# Patient Record
Sex: FEMALE | Race: WHITE | NOT HISPANIC OR LATINO
[De-identification: names, ages, dates, MRNs, and addresses within clinical notes are randomized per-mention and may not be internally consistent; named-entity substitution may affect disease eponyms.]

---

## 2017-01-12 ENCOUNTER — APPOINTMENT (OUTPATIENT)
Dept: INFUSION THERAPY | Facility: CLINIC | Age: 42
End: 2017-01-12

## 2017-01-13 ENCOUNTER — APPOINTMENT (OUTPATIENT)
Dept: INFUSION THERAPY | Facility: CLINIC | Age: 42
End: 2017-01-13

## 2017-01-16 ENCOUNTER — APPOINTMENT (OUTPATIENT)
Dept: HEMATOLOGY ONCOLOGY | Facility: CLINIC | Age: 42
End: 2017-01-16

## 2017-01-23 ENCOUNTER — APPOINTMENT (OUTPATIENT)
Dept: HEMATOLOGY ONCOLOGY | Facility: CLINIC | Age: 42
End: 2017-01-23

## 2017-01-23 ENCOUNTER — APPOINTMENT (OUTPATIENT)
Dept: INFUSION THERAPY | Facility: CLINIC | Age: 42
End: 2017-01-23

## 2017-01-23 VITALS
SYSTOLIC BLOOD PRESSURE: 151 MMHG | BODY MASS INDEX: 31.76 KG/M2 | RESPIRATION RATE: 14 BRPM | HEIGHT: 64 IN | HEART RATE: 86 BPM | TEMPERATURE: 97.1 F | DIASTOLIC BLOOD PRESSURE: 82 MMHG | WEIGHT: 186 LBS

## 2017-01-24 ENCOUNTER — APPOINTMENT (OUTPATIENT)
Dept: INFUSION THERAPY | Facility: CLINIC | Age: 42
End: 2017-01-24

## 2017-01-24 LAB
ALBUMIN SERPL-MCNC: 3.9 G/DL
ALBUMIN/GLOB SERPL: 1.15
ALP SERPL-CCNC: 92 IU/L
ALT SERPL-CCNC: 46 IU/L
ANION GAP SERPL CALC-SCNC: 9 MEQ/L
AST SERPL-CCNC: 27 IU/L
BASOPHILS # BLD: 0.03 TH/MM3
BASOPHILS # BLD: 0.04 TH/MM3
BASOPHILS NFR BLD: 0.4 %
BASOPHILS NFR BLD: 0.7 %
BILIRUB SERPL-MCNC: 0.3 MG/DL
BUN SERPL-MCNC: 10 MG/DL
BUN/CREAT SERPL: 9 %
CALCIUM SERPL-MCNC: 9.6 MG/DL
CHLORIDE SERPL-SCNC: 104 MEQ/L
CO2 SERPL-SCNC: 25 MEQ/L
CREAT SERPL-MCNC: 1.11 MG/DL
EOSINOPHIL # BLD: 0.31 TH/MM3
EOSINOPHIL # BLD: 0.53 TH/MM3
EOSINOPHIL NFR BLD: 5.3 %
EOSINOPHIL NFR BLD: 7.8 %
ERYTHROCYTE [DISTWIDTH] IN BLOOD BY AUTOMATED COUNT: 12.4 %
ERYTHROCYTE [DISTWIDTH] IN BLOOD BY AUTOMATED COUNT: 12.5 %
GFR SERPL CREATININE-BSD FRML MDRD: 54
GLUCOSE SERPL-MCNC: 74 MG/DL
GRANULOCYTES # BLD: 2.64 TH/MM3
GRANULOCYTES # BLD: 3.5 TH/MM3
GRANULOCYTES NFR BLD: 45.3 %
GRANULOCYTES NFR BLD: 51.2 %
HCT VFR BLD AUTO: 41.5 %
HCT VFR BLD AUTO: 41.8 %
HGB BLD-MCNC: 14 G/DL
HGB BLD-MCNC: 14.1 G/DL
IMM GRANULOCYTES # BLD: 0.01 TH/MM3
IMM GRANULOCYTES # BLD: 0.07 TH/MM3
IMM GRANULOCYTES NFR BLD: 0.2 %
IMM GRANULOCYTES NFR BLD: 1 %
LYMPHOCYTES # BLD: 2.08 TH/MM3
LYMPHOCYTES # BLD: 2.2 TH/MM3
LYMPHOCYTES NFR BLD: 30.5 %
LYMPHOCYTES NFR BLD: 37.8 %
MCH RBC QN AUTO: 30.1 PG
MCH RBC QN AUTO: 30.4 PG
MCHC RBC AUTO-ENTMCNC: 33.5 G/DL
MCHC RBC AUTO-ENTMCNC: 34 G/DL
MCV RBC AUTO: 89.4 FL
MCV RBC AUTO: 89.9 FL
MONOCYTES # BLD: 0.62 TH/MM3
MONOCYTES # BLD: 0.62 TH/MM3
MONOCYTES NFR BLD: 10.7 %
MONOCYTES NFR BLD: 9.1 %
PLATELET # BLD: 281 TH/MM3
PLATELET # BLD: 347 TH/MM3
PMV BLD AUTO: 9.3 FL
PMV BLD AUTO: 9.5 FL
POTASSIUM SERPL-SCNC: 4.3 MMOL/L
PROT SERPL-MCNC: 7.3 G/DL
RBC # BLD AUTO: 4.64 MIL/MM3
RBC # BLD AUTO: 4.65 MIL/MM3
SODIUM SERPL-SCNC: 138 MEQ/L
WBC # BLD: 5.82 TH/MM3
WBC # BLD: 6.83 TH/MM3

## 2017-01-26 ENCOUNTER — OTHER (OUTPATIENT)
Age: 42
End: 2017-01-26

## 2017-01-30 ENCOUNTER — APPOINTMENT (OUTPATIENT)
Dept: HEMATOLOGY ONCOLOGY | Facility: CLINIC | Age: 42
End: 2017-01-30

## 2017-01-30 ENCOUNTER — RESULT REVIEW (OUTPATIENT)
Age: 42
End: 2017-01-30

## 2017-01-31 LAB
APPEARANCE UR: CLEAR
BILIRUB UR QL STRIP: ABNORMAL
COLOR UR: NORMAL
GLUCOSE UR STRIP-MCNC: NEGATIVE MG/DL
HGB UR QL STRIP: NEGATIVE
KETONES UR STRIP-MCNC: ABNORMAL MG/DL
NITRITE UR QL STRIP: NEGATIVE
PH UR STRIP: 6
PROT UR STRIP-MCNC: 30 MG/DL
SP GR UR STRIP: >= 1.03
URINE COMP/EPITH (NORTH): ABNORMAL
UROBILINOGEN UR STRIP-MCNC: 0.2 MG/DL
WBC URNS QL MICRO: ABNORMAL
WBC URNS QL MICRO: ABNORMAL P/HPF

## 2017-02-06 ENCOUNTER — APPOINTMENT (OUTPATIENT)
Dept: HEMATOLOGY ONCOLOGY | Facility: CLINIC | Age: 42
End: 2017-02-06

## 2017-02-06 ENCOUNTER — APPOINTMENT (OUTPATIENT)
Dept: INFUSION THERAPY | Facility: CLINIC | Age: 42
End: 2017-02-06

## 2017-02-06 VITALS
BODY MASS INDEX: 32.44 KG/M2 | RESPIRATION RATE: 16 BRPM | TEMPERATURE: 97.3 F | HEIGHT: 64 IN | SYSTOLIC BLOOD PRESSURE: 109 MMHG | HEART RATE: 69 BPM | WEIGHT: 190 LBS | DIASTOLIC BLOOD PRESSURE: 61 MMHG

## 2017-02-07 ENCOUNTER — APPOINTMENT (OUTPATIENT)
Dept: INFUSION THERAPY | Facility: CLINIC | Age: 42
End: 2017-02-07

## 2017-02-17 ENCOUNTER — APPOINTMENT (OUTPATIENT)
Dept: HEMATOLOGY ONCOLOGY | Facility: CLINIC | Age: 42
End: 2017-02-17

## 2017-02-17 VITALS
BODY MASS INDEX: 30.73 KG/M2 | RESPIRATION RATE: 14 BRPM | TEMPERATURE: 98.7 F | HEART RATE: 84 BPM | SYSTOLIC BLOOD PRESSURE: 119 MMHG | HEIGHT: 64 IN | DIASTOLIC BLOOD PRESSURE: 67 MMHG | WEIGHT: 180 LBS

## 2017-02-22 ENCOUNTER — APPOINTMENT (OUTPATIENT)
Dept: INFUSION THERAPY | Facility: CLINIC | Age: 42
End: 2017-02-22

## 2017-02-28 ENCOUNTER — APPOINTMENT (OUTPATIENT)
Dept: INFUSION THERAPY | Facility: CLINIC | Age: 42
End: 2017-02-28

## 2017-03-06 ENCOUNTER — APPOINTMENT (OUTPATIENT)
Dept: INFUSION THERAPY | Facility: CLINIC | Age: 42
End: 2017-03-06

## 2017-03-06 ENCOUNTER — APPOINTMENT (OUTPATIENT)
Dept: HEMATOLOGY ONCOLOGY | Facility: CLINIC | Age: 42
End: 2017-03-06

## 2017-03-08 ENCOUNTER — APPOINTMENT (OUTPATIENT)
Dept: BREAST CENTER | Facility: CLINIC | Age: 42
End: 2017-03-08

## 2017-03-13 ENCOUNTER — APPOINTMENT (OUTPATIENT)
Dept: BREAST CENTER | Facility: CLINIC | Age: 42
End: 2017-03-13

## 2017-03-13 LAB
ALBUMIN SERPL-MCNC: 3.4 G/DL
ALP SERPL-CCNC: 98 IU/L
ALT SERPL-CCNC: 23 IU/L
AST SERPL-CCNC: 19 IU/L
BACTERIA UR CULT: NORMAL
BASOPHILS # BLD: 0.04 TH/MM3
BASOPHILS # BLD: 0.12 TH/MM3
BASOPHILS # BLD: 0.13 TH/MM3
BASOPHILS NFR BLD: 0.6 %
BASOPHILS NFR BLD: 1.1 %
BASOPHILS NFR BLD: 3.1 %
BILIRUB DIRECT SERPL-MCNC: < 0.1 MG/DL
BILIRUB SERPL-MCNC: 0.3 MG/DL
EOSINOPHIL # BLD: 0.01 TH/MM3
EOSINOPHIL # BLD: 0.05 TH/MM3
EOSINOPHIL # BLD: 0.11 TH/MM3
EOSINOPHIL NFR BLD: 0.2 %
EOSINOPHIL NFR BLD: 0.8 %
EOSINOPHIL NFR BLD: 1 %
ERYTHROCYTE [DISTWIDTH] IN BLOOD BY AUTOMATED COUNT: 12.2 %
ERYTHROCYTE [DISTWIDTH] IN BLOOD BY AUTOMATED COUNT: 12.5 %
ERYTHROCYTE [DISTWIDTH] IN BLOOD BY AUTOMATED COUNT: 14 %
GRANULOCYTES # BLD: 1.79 TH/MM3
GRANULOCYTES # BLD: 4.45 TH/MM3
GRANULOCYTES # BLD: 6.88 TH/MM3
GRANULOCYTES NFR BLD: 42 %
GRANULOCYTES NFR BLD: 61.5 %
GRANULOCYTES NFR BLD: 70.2 %
HCT VFR BLD AUTO: 31.4 %
HCT VFR BLD AUTO: 34.7 %
HCT VFR BLD AUTO: 35.8 %
HGB BLD-MCNC: 11 G/DL
HGB BLD-MCNC: 11.7 G/DL
HGB BLD-MCNC: 12 G/DL
IMM GRANULOCYTES # BLD: 0.05 TH/MM3
IMM GRANULOCYTES # BLD: 0.24 TH/MM3
IMM GRANULOCYTES # BLD: 1.18 TH/MM3
IMM GRANULOCYTES NFR BLD: 0.8 %
IMM GRANULOCYTES NFR BLD: 10.6 %
IMM GRANULOCYTES NFR BLD: 5.6 %
LYMPHOCYTES # BLD: 1.04 TH/MM3
LYMPHOCYTES # BLD: 1.11 TH/MM3
LYMPHOCYTES # BLD: 1.67 TH/MM3
LYMPHOCYTES NFR BLD: 15 %
LYMPHOCYTES NFR BLD: 16.4 %
LYMPHOCYTES NFR BLD: 26.1 %
MCH RBC QN AUTO: 30.5 PG
MCH RBC QN AUTO: 31.1 PG
MCH RBC QN AUTO: 31.2 PG
MCHC RBC AUTO-ENTMCNC: 33.5 G/DL
MCHC RBC AUTO-ENTMCNC: 33.7 G/DL
MCHC RBC AUTO-ENTMCNC: 35 G/DL
MCV RBC AUTO: 89 FL
MCV RBC AUTO: 90.4 FL
MCV RBC AUTO: 92.7 FL
MONOCYTES # BLD: 0.71 TH/MM3
MONOCYTES # BLD: 0.98 TH/MM3
MONOCYTES # BLD: 1.21 TH/MM3
MONOCYTES NFR BLD: 10.8 %
MONOCYTES NFR BLD: 11.2 %
MONOCYTES NFR BLD: 23 %
PLATELET # BLD: 202 TH/MM3
PLATELET # BLD: 214 TH/MM3
PLATELET # BLD: 330 TH/MM3
PMV BLD AUTO: 9.5 FL
PMV BLD AUTO: 9.6 FL
PMV BLD AUTO: 9.9 FL
PROT SERPL-MCNC: 5.8 G/DL
RBC # BLD AUTO: 3.53 MIL/MM3
RBC # BLD AUTO: 3.84 MIL/MM3
RBC # BLD AUTO: 3.86 MIL/MM3
WBC # BLD: 11.17 TH/MM3
WBC # BLD: 4.26 TH/MM3
WBC # BLD: 6.34 TH/MM3

## 2017-03-23 ENCOUNTER — APPOINTMENT (OUTPATIENT)
Dept: HEMATOLOGY ONCOLOGY | Facility: CLINIC | Age: 42
End: 2017-03-23

## 2017-03-23 ENCOUNTER — APPOINTMENT (OUTPATIENT)
Dept: INFUSION THERAPY | Facility: CLINIC | Age: 42
End: 2017-03-23

## 2017-03-23 VITALS
WEIGHT: 183 LBS | RESPIRATION RATE: 14 BRPM | BODY MASS INDEX: 31.24 KG/M2 | HEIGHT: 64 IN | SYSTOLIC BLOOD PRESSURE: 130 MMHG | TEMPERATURE: 97.5 F | DIASTOLIC BLOOD PRESSURE: 75 MMHG | HEART RATE: 86 BPM

## 2017-03-24 ENCOUNTER — APPOINTMENT (OUTPATIENT)
Dept: INFUSION THERAPY | Facility: CLINIC | Age: 42
End: 2017-03-24

## 2017-03-24 LAB
ALBUMIN SERPL-MCNC: 3.7 G/DL
ALP SERPL-CCNC: 104 IU/L
ALT SERPL-CCNC: 31 IU/L
ANION GAP SERPL CALC-SCNC: 11 MEQ/L
AST SERPL-CCNC: 22 IU/L
BASOPHILS # BLD: 0.05 TH/MM3
BASOPHILS NFR BLD: 1 %
BILIRUB DIRECT SERPL-MCNC: < 0.1 MG/DL
BILIRUB SERPL-MCNC: 0.4 MG/DL
BUN SERPL-MCNC: 8 MG/DL
BUN/CREAT SERPL: 8.3 %
CALCIUM SERPL-MCNC: 9.3 MG/DL
CHLORIDE SERPL-SCNC: 104 MEQ/L
CO2 SERPL-SCNC: 24 MEQ/L
CREAT SERPL-MCNC: 0.96 MG/DL
EOSINOPHIL # BLD: 0.63 TH/MM3
EOSINOPHIL NFR BLD: 13.1 %
ERYTHROCYTE [DISTWIDTH] IN BLOOD BY AUTOMATED COUNT: 13.9 %
GFR SERPL CREATININE-BSD FRML MDRD: 63
GLUCOSE SERPL-MCNC: 90 MG/DL
GRANULOCYTES # BLD: 2.44 TH/MM3
GRANULOCYTES NFR BLD: 50.8 %
HCT VFR BLD AUTO: 35.8 %
HGB BLD-MCNC: 12.1 G/DL
IMM GRANULOCYTES # BLD: 0.01 TH/MM3
IMM GRANULOCYTES NFR BLD: 0.2 %
LYMPHOCYTES # BLD: 1.29 TH/MM3
LYMPHOCYTES NFR BLD: 26.8 %
MCH RBC QN AUTO: 30.8 PG
MCHC RBC AUTO-ENTMCNC: 33.8 G/DL
MCV RBC AUTO: 91.1 FL
MONOCYTES # BLD: 0.39 TH/MM3
MONOCYTES NFR BLD: 8.1 %
PLATELET # BLD: 263 TH/MM3
PMV BLD AUTO: 9.3 FL
POTASSIUM SERPL-SCNC: 3.9 MMOL/L
PROT SERPL-MCNC: 6.9 G/DL
RBC # BLD AUTO: 3.93 MIL/MM3
SODIUM SERPL-SCNC: 139 MEQ/L
WBC # BLD: 4.81 TH/MM3

## 2017-03-28 ENCOUNTER — APPOINTMENT (OUTPATIENT)
Dept: INFUSION THERAPY | Facility: CLINIC | Age: 42
End: 2017-03-28

## 2017-03-28 ENCOUNTER — OUTPATIENT (OUTPATIENT)
Dept: OUTPATIENT SERVICES | Facility: HOSPITAL | Age: 42
LOS: 1 days | Discharge: HOME | End: 2017-03-28

## 2017-04-12 ENCOUNTER — APPOINTMENT (OUTPATIENT)
Dept: BREAST CENTER | Facility: CLINIC | Age: 42
End: 2017-04-12

## 2017-04-13 ENCOUNTER — APPOINTMENT (OUTPATIENT)
Dept: HEMATOLOGY ONCOLOGY | Facility: CLINIC | Age: 42
End: 2017-04-13

## 2017-04-18 ENCOUNTER — APPOINTMENT (OUTPATIENT)
Dept: INFUSION THERAPY | Facility: CLINIC | Age: 42
End: 2017-04-18

## 2017-04-20 ENCOUNTER — APPOINTMENT (OUTPATIENT)
Dept: INFUSION THERAPY | Facility: CLINIC | Age: 42
End: 2017-04-20

## 2017-05-15 ENCOUNTER — RX RENEWAL (OUTPATIENT)
Age: 42
End: 2017-05-15

## 2017-05-15 DIAGNOSIS — K12.30 ORAL MUCOSITIS (ULCERATIVE), UNSPECIFIED: ICD-10-CM

## 2017-05-19 ENCOUNTER — APPOINTMENT (OUTPATIENT)
Dept: HEMATOLOGY ONCOLOGY | Facility: CLINIC | Age: 42
End: 2017-05-19

## 2017-05-19 ENCOUNTER — APPOINTMENT (OUTPATIENT)
Dept: INFUSION THERAPY | Facility: CLINIC | Age: 42
End: 2017-05-19

## 2017-05-19 VITALS
WEIGHT: 181 LBS | DIASTOLIC BLOOD PRESSURE: 78 MMHG | HEART RATE: 88 BPM | RESPIRATION RATE: 14 BRPM | HEIGHT: 64 IN | TEMPERATURE: 99.3 F | SYSTOLIC BLOOD PRESSURE: 122 MMHG | BODY MASS INDEX: 30.9 KG/M2

## 2017-05-24 ENCOUNTER — OUTPATIENT (OUTPATIENT)
Dept: OUTPATIENT SERVICES | Facility: HOSPITAL | Age: 42
LOS: 1 days | Discharge: HOME | End: 2017-05-24

## 2017-05-26 ENCOUNTER — APPOINTMENT (OUTPATIENT)
Dept: INFUSION THERAPY | Facility: CLINIC | Age: 42
End: 2017-05-26

## 2017-05-26 LAB
ALBUMIN SERPL-MCNC: 3.2 G/DL
ALBUMIN SERPL-MCNC: 3.6 G/DL
ALP SERPL-CCNC: 79 IU/L
ALP SERPL-CCNC: 82 IU/L
ALT SERPL-CCNC: 32 IU/L
ALT SERPL-CCNC: 52 IU/L
ANION GAP SERPL CALC-SCNC: 11 MEQ/L
ANION GAP SERPL CALC-SCNC: 12 MEQ/L
AST SERPL-CCNC: 21 IU/L
AST SERPL-CCNC: 37 IU/L
BASOPHILS # BLD: 0.03 TH/MM3
BASOPHILS # BLD: 0.04 TH/MM3
BASOPHILS NFR BLD: 0.4 %
BASOPHILS NFR BLD: 0.8 %
BILIRUB DIRECT SERPL-MCNC: < 0.1 MG/DL
BILIRUB DIRECT SERPL-MCNC: < 0.1 MG/DL
BILIRUB SERPL-MCNC: 0.4 MG/DL
BILIRUB SERPL-MCNC: 0.5 MG/DL
BUN SERPL-MCNC: 10 MG/DL
BUN SERPL-MCNC: 5 MG/DL
BUN/CREAT SERPL: 12.7 %
BUN/CREAT SERPL: 6.2 %
CALCIUM SERPL-MCNC: 9.3 MG/DL
CALCIUM SERPL-MCNC: 9.4 MG/DL
CHLORIDE SERPL-SCNC: 102 MEQ/L
CHLORIDE SERPL-SCNC: 102 MEQ/L
CO2 SERPL-SCNC: 23 MEQ/L
CO2 SERPL-SCNC: 24 MEQ/L
CREAT SERPL-MCNC: 0.79 MG/DL
CREAT SERPL-MCNC: 0.81 MG/DL
EOSINOPHIL # BLD: 0.08 TH/MM3
EOSINOPHIL # BLD: 0.28 TH/MM3
EOSINOPHIL NFR BLD: 2.1 %
EOSINOPHIL NFR BLD: 2.8 %
ERYTHROCYTE [DISTWIDTH] IN BLOOD BY AUTOMATED COUNT: 14 %
ERYTHROCYTE [DISTWIDTH] IN BLOOD BY AUTOMATED COUNT: 14.5 %
GFR SERPL CREATININE-BSD FRML MDRD: 77
GFR SERPL CREATININE-BSD FRML MDRD: 79
GLUCOSE SERPL-MCNC: 107 MG/DL
GLUCOSE SERPL-MCNC: 78 MG/DL
GRANULOCYTES # BLD: 1.89 TH/MM3
GRANULOCYTES # BLD: 6.73 TH/MM3
GRANULOCYTES NFR BLD: 49.1 %
GRANULOCYTES NFR BLD: 66.9 %
HCT VFR BLD AUTO: 33.6 %
HCT VFR BLD AUTO: 36.1 %
HGB BLD-MCNC: 10.9 G/DL
HGB BLD-MCNC: 12 G/DL
IMM GRANULOCYTES # BLD: 0.05 TH/MM3
IMM GRANULOCYTES # BLD: 0.13 TH/MM3
IMM GRANULOCYTES NFR BLD: 1.3 %
IMM GRANULOCYTES NFR BLD: 1.3 %
LYMPHOCYTES # BLD: 1.14 TH/MM3
LYMPHOCYTES # BLD: 1.46 TH/MM3
LYMPHOCYTES NFR BLD: 14.5 %
LYMPHOCYTES NFR BLD: 29.6 %
MCH RBC QN AUTO: 30.7 PG
MCH RBC QN AUTO: 30.7 PG
MCHC RBC AUTO-ENTMCNC: 32.4 G/DL
MCHC RBC AUTO-ENTMCNC: 33.2 G/DL
MCV RBC AUTO: 92.3 FL
MCV RBC AUTO: 94.6 FL
MONOCYTES # BLD: 0.66 TH/MM3
MONOCYTES # BLD: 1.42 TH/MM3
MONOCYTES NFR BLD: 14.1 %
MONOCYTES NFR BLD: 17.1 %
PLATELET # BLD: 220 TH/MM3
PLATELET # BLD: 275 TH/MM3
PMV BLD AUTO: 10 FL
PMV BLD AUTO: 10.2 FL
POTASSIUM SERPL-SCNC: 3.8 MMOL/L
POTASSIUM SERPL-SCNC: 4.2 MMOL/L
PROT SERPL-MCNC: 6.2 G/DL
PROT SERPL-MCNC: 6.7 G/DL
RBC # BLD AUTO: 3.55 MIL/MM3
RBC # BLD AUTO: 3.91 MIL/MM3
SODIUM SERPL-SCNC: 137 MEQ/L
SODIUM SERPL-SCNC: 137 MEQ/L
WBC # BLD: 10.06 TH/MM3
WBC # BLD: 3.85 TH/MM3

## 2017-05-31 ENCOUNTER — APPOINTMENT (OUTPATIENT)
Dept: BREAST CENTER | Facility: CLINIC | Age: 42
End: 2017-05-31

## 2017-06-02 ENCOUNTER — APPOINTMENT (OUTPATIENT)
Dept: INFUSION THERAPY | Facility: CLINIC | Age: 42
End: 2017-06-02

## 2017-06-09 ENCOUNTER — APPOINTMENT (OUTPATIENT)
Dept: INFUSION THERAPY | Facility: CLINIC | Age: 42
End: 2017-06-09

## 2017-06-16 ENCOUNTER — APPOINTMENT (OUTPATIENT)
Dept: HEMATOLOGY ONCOLOGY | Facility: CLINIC | Age: 42
End: 2017-06-16

## 2017-06-16 ENCOUNTER — APPOINTMENT (OUTPATIENT)
Dept: INFUSION THERAPY | Facility: CLINIC | Age: 42
End: 2017-06-16

## 2017-06-16 VITALS
TEMPERATURE: 97.2 F | HEIGHT: 64 IN | WEIGHT: 181 LBS | BODY MASS INDEX: 30.9 KG/M2 | SYSTOLIC BLOOD PRESSURE: 111 MMHG | RESPIRATION RATE: 12 BRPM | HEART RATE: 84 BPM | DIASTOLIC BLOOD PRESSURE: 85 MMHG

## 2017-06-16 LAB
BASOPHILS # BLD: 0.03 TH/MM3
BASOPHILS # BLD: 0.04 TH/MM3
BASOPHILS NFR BLD: 0.6 %
BASOPHILS NFR BLD: 0.8 %
EOSINOPHIL # BLD: 0.31 TH/MM3
EOSINOPHIL # BLD: 0.38 TH/MM3
EOSINOPHIL NFR BLD: 6.2 %
EOSINOPHIL NFR BLD: 7.7 %
ERYTHROCYTE [DISTWIDTH] IN BLOOD BY AUTOMATED COUNT: 13.9 %
ERYTHROCYTE [DISTWIDTH] IN BLOOD BY AUTOMATED COUNT: 14.1 %
GRANULOCYTES # BLD: 2.72 TH/MM3
GRANULOCYTES # BLD: 2.75 TH/MM3
GRANULOCYTES NFR BLD: 54.7 %
GRANULOCYTES NFR BLD: 55.5 %
HCT VFR BLD AUTO: 37.4 %
HCT VFR BLD AUTO: 37.6 %
HGB BLD-MCNC: 12.7 G/DL
HGB BLD-MCNC: 12.9 G/DL
IMM GRANULOCYTES # BLD: 0.02 TH/MM3
IMM GRANULOCYTES # BLD: 0.06 TH/MM3
IMM GRANULOCYTES NFR BLD: 0.4 %
IMM GRANULOCYTES NFR BLD: 1.2 %
LYMPHOCYTES # BLD: 1.18 TH/MM3
LYMPHOCYTES # BLD: 1.48 TH/MM3
LYMPHOCYTES NFR BLD: 24 %
LYMPHOCYTES NFR BLD: 29.5 %
MCH RBC QN AUTO: 31.1 PG
MCH RBC QN AUTO: 31.2 PG
MCHC RBC AUTO-ENTMCNC: 33.8 G/DL
MCHC RBC AUTO-ENTMCNC: 34.5 G/DL
MCV RBC AUTO: 90.3 FL
MCV RBC AUTO: 92.2 FL
MONOCYTES # BLD: 0.38 TH/MM3
MONOCYTES # BLD: 0.58 TH/MM3
MONOCYTES NFR BLD: 11.8 %
MONOCYTES NFR BLD: 7.6 %
PLATELET # BLD: 215 TH/MM3
PLATELET # BLD: 267 TH/MM3
PMV BLD AUTO: 9.5 FL
PMV BLD AUTO: 9.5 FL
RBC # BLD AUTO: 4.08 MIL/MM3
RBC # BLD AUTO: 4.14 MIL/MM3
WBC # BLD: 4.91 TH/MM3
WBC # BLD: 5.02 TH/MM3

## 2017-06-19 ENCOUNTER — RX RENEWAL (OUTPATIENT)
Age: 42
End: 2017-06-19

## 2017-06-23 ENCOUNTER — APPOINTMENT (OUTPATIENT)
Dept: INFUSION THERAPY | Facility: CLINIC | Age: 42
End: 2017-06-23

## 2017-06-29 DIAGNOSIS — C50.912 MALIGNANT NEOPLASM OF UNSPECIFIED SITE OF LEFT FEMALE BREAST: ICD-10-CM

## 2017-06-30 ENCOUNTER — APPOINTMENT (OUTPATIENT)
Dept: INFUSION THERAPY | Facility: CLINIC | Age: 42
End: 2017-06-30

## 2017-06-30 VITALS
HEART RATE: 86 BPM | DIASTOLIC BLOOD PRESSURE: 85 MMHG | SYSTOLIC BLOOD PRESSURE: 120 MMHG | TEMPERATURE: 97 F | RESPIRATION RATE: 14 BRPM

## 2017-07-07 ENCOUNTER — APPOINTMENT (OUTPATIENT)
Dept: INFUSION THERAPY | Facility: CLINIC | Age: 42
End: 2017-07-07

## 2017-07-13 ENCOUNTER — RESULT REVIEW (OUTPATIENT)
Age: 42
End: 2017-07-13

## 2017-07-14 ENCOUNTER — APPOINTMENT (OUTPATIENT)
Dept: INFUSION THERAPY | Facility: CLINIC | Age: 42
End: 2017-07-14

## 2017-07-14 ENCOUNTER — APPOINTMENT (OUTPATIENT)
Dept: HEMATOLOGY ONCOLOGY | Facility: CLINIC | Age: 42
End: 2017-07-14

## 2017-07-14 VITALS
HEART RATE: 83 BPM | DIASTOLIC BLOOD PRESSURE: 84 MMHG | HEIGHT: 64 IN | TEMPERATURE: 97.8 F | BODY MASS INDEX: 30.9 KG/M2 | RESPIRATION RATE: 14 BRPM | SYSTOLIC BLOOD PRESSURE: 131 MMHG | WEIGHT: 181 LBS

## 2017-07-21 ENCOUNTER — APPOINTMENT (OUTPATIENT)
Dept: INFUSION THERAPY | Facility: CLINIC | Age: 42
End: 2017-07-21

## 2017-07-21 VITALS
DIASTOLIC BLOOD PRESSURE: 68 MMHG | SYSTOLIC BLOOD PRESSURE: 126 MMHG | HEART RATE: 85 BPM | TEMPERATURE: 97.7 F | RESPIRATION RATE: 18 BRPM

## 2017-07-28 ENCOUNTER — APPOINTMENT (OUTPATIENT)
Dept: INFUSION THERAPY | Facility: CLINIC | Age: 42
End: 2017-07-28

## 2017-08-04 ENCOUNTER — APPOINTMENT (OUTPATIENT)
Dept: INFUSION THERAPY | Facility: CLINIC | Age: 42
End: 2017-08-04

## 2017-08-04 VITALS
HEART RATE: 79 BPM | DIASTOLIC BLOOD PRESSURE: 76 MMHG | SYSTOLIC BLOOD PRESSURE: 131 MMHG | TEMPERATURE: 98.2 F | RESPIRATION RATE: 14 BRPM

## 2017-08-11 ENCOUNTER — APPOINTMENT (OUTPATIENT)
Dept: HEMATOLOGY ONCOLOGY | Facility: CLINIC | Age: 42
End: 2017-08-11

## 2017-08-11 ENCOUNTER — APPOINTMENT (OUTPATIENT)
Dept: INFUSION THERAPY | Facility: CLINIC | Age: 42
End: 2017-08-11

## 2017-08-11 VITALS
RESPIRATION RATE: 14 BRPM | HEART RATE: 83 BPM | BODY MASS INDEX: 31.76 KG/M2 | TEMPERATURE: 98.3 F | SYSTOLIC BLOOD PRESSURE: 134 MMHG | WEIGHT: 186 LBS | HEIGHT: 64 IN | DIASTOLIC BLOOD PRESSURE: 76 MMHG

## 2017-08-17 ENCOUNTER — RX RENEWAL (OUTPATIENT)
Age: 42
End: 2017-08-17

## 2017-08-18 ENCOUNTER — APPOINTMENT (OUTPATIENT)
Dept: INFUSION THERAPY | Facility: CLINIC | Age: 42
End: 2017-08-18

## 2017-08-18 ENCOUNTER — OUTPATIENT (OUTPATIENT)
Dept: OUTPATIENT SERVICES | Facility: HOSPITAL | Age: 42
LOS: 1 days | Discharge: HOME | End: 2017-08-18

## 2017-08-18 DIAGNOSIS — Z51.11 ENCOUNTER FOR ANTINEOPLASTIC CHEMOTHERAPY: ICD-10-CM

## 2017-08-18 DIAGNOSIS — C50.812 MALIGNANT NEOPLASM OF OVERLAPPING SITES OF LEFT FEMALE BREAST: ICD-10-CM

## 2017-08-27 LAB
ALBUMIN SERPL-MCNC: 3.7 G/DL
ALBUMIN SERPL-MCNC: 3.8 G/DL
ALBUMIN/GLOB SERPL: 1.48
ALP SERPL-CCNC: 71 IU/L
ALP SERPL-CCNC: 77 IU/L
ALT SERPL-CCNC: 24 IU/L
ALT SERPL-CCNC: 39 IU/L
ANION GAP SERPL CALC-SCNC: 7 MEQ/L
ANION GAP SERPL CALC-SCNC: 8 MEQ/L
AST SERPL-CCNC: 19 IU/L
AST SERPL-CCNC: 28 IU/L
BASOPHILS # BLD: 0.02 TH/MM3
BASOPHILS # BLD: 0.03 TH/MM3
BASOPHILS # BLD: 0.07 TH/MM3
BASOPHILS NFR BLD: 0.4 %
BASOPHILS NFR BLD: 0.4 %
BASOPHILS NFR BLD: 0.5 %
BASOPHILS NFR BLD: 0.5 %
BASOPHILS NFR BLD: 0.6 %
BASOPHILS NFR BLD: 1.5 %
BILIRUB DIRECT SERPL-MCNC: < 0.1 MG/DL
BILIRUB SERPL-MCNC: 0.3 MG/DL
BILIRUB SERPL-MCNC: 0.6 MG/DL
BUN SERPL-MCNC: 6 MG/DL
BUN SERPL-MCNC: 9 MG/DL
BUN/CREAT SERPL: 11.8 %
BUN/CREAT SERPL: 8 %
CALCIUM SERPL-MCNC: 9 MG/DL
CALCIUM SERPL-MCNC: 9 MG/DL
CHLORIDE SERPL-SCNC: 107 MEQ/L
CHLORIDE SERPL-SCNC: 107 MEQ/L
CO2 SERPL-SCNC: 24 MEQ/L
CO2 SERPL-SCNC: 25 MEQ/L
CREAT SERPL-MCNC: 0.75 MG/DL
CREAT SERPL-MCNC: 0.76 MG/DL
EOSINOPHIL # BLD: 0.1 TH/MM3
EOSINOPHIL # BLD: 0.12 TH/MM3
EOSINOPHIL # BLD: 0.12 TH/MM3
EOSINOPHIL # BLD: 0.13 TH/MM3
EOSINOPHIL # BLD: 0.14 TH/MM3
EOSINOPHIL # BLD: 0.15 TH/MM3
EOSINOPHIL # BLD: 0.15 TH/MM3
EOSINOPHIL # BLD: 0.17 TH/MM3
EOSINOPHIL NFR BLD: 2.3 %
EOSINOPHIL NFR BLD: 2.4 %
EOSINOPHIL NFR BLD: 2.5 %
EOSINOPHIL NFR BLD: 2.9 %
EOSINOPHIL NFR BLD: 3 %
EOSINOPHIL NFR BLD: 3.2 %
EOSINOPHIL NFR BLD: 3.2 %
EOSINOPHIL NFR BLD: 3.9 %
ERYTHROCYTE [DISTWIDTH] IN BLOOD BY AUTOMATED COUNT: 14.3 %
ERYTHROCYTE [DISTWIDTH] IN BLOOD BY AUTOMATED COUNT: 14.3 %
ERYTHROCYTE [DISTWIDTH] IN BLOOD BY AUTOMATED COUNT: 14.9 %
ERYTHROCYTE [DISTWIDTH] IN BLOOD BY AUTOMATED COUNT: 15.1 %
ERYTHROCYTE [DISTWIDTH] IN BLOOD BY AUTOMATED COUNT: 15.4 %
ERYTHROCYTE [DISTWIDTH] IN BLOOD BY AUTOMATED COUNT: 15.5 %
ERYTHROCYTE [DISTWIDTH] IN BLOOD BY AUTOMATED COUNT: 15.8 %
ERYTHROCYTE [DISTWIDTH] IN BLOOD BY AUTOMATED COUNT: 16 %
ERYTHROCYTE [DISTWIDTH] IN BLOOD BY AUTOMATED COUNT: 16.1 %
GFR SERPL CREATININE-BSD FRML MDRD: 83
GFR SERPL CREATININE-BSD FRML MDRD: 84
GLUCOSE SERPL-MCNC: 75 MG/DL
GLUCOSE SERPL-MCNC: 82 MG/DL
GRANULOCYTES # BLD: 2.3 TH/MM3
GRANULOCYTES # BLD: 2.36 TH/MM3
GRANULOCYTES # BLD: 2.7 TH/MM3
GRANULOCYTES # BLD: 2.82 TH/MM3
GRANULOCYTES # BLD: 2.86 TH/MM3
GRANULOCYTES # BLD: 3.05 TH/MM3
GRANULOCYTES # BLD: 3.1 TH/MM3
GRANULOCYTES # BLD: 3.3 TH/MM3
GRANULOCYTES # BLD: 3.4 TH/MM3
GRANULOCYTES NFR BLD: 56.6 %
GRANULOCYTES NFR BLD: 58 %
GRANULOCYTES NFR BLD: 61 %
GRANULOCYTES NFR BLD: 61.5 %
GRANULOCYTES NFR BLD: 61.8 %
GRANULOCYTES NFR BLD: 62.2 %
GRANULOCYTES NFR BLD: 62.8 %
GRANULOCYTES NFR BLD: 66.1 %
GRANULOCYTES NFR BLD: 67.5 %
HCT VFR BLD AUTO: 33.2 %
HCT VFR BLD AUTO: 34.2 %
HCT VFR BLD AUTO: 34.3 %
HCT VFR BLD AUTO: 34.3 %
HCT VFR BLD AUTO: 34.4 %
HCT VFR BLD AUTO: 34.6 %
HCT VFR BLD AUTO: 34.9 %
HCT VFR BLD AUTO: 34.9 %
HCT VFR BLD AUTO: 35.5 %
HGB BLD-MCNC: 11.3 G/DL
HGB BLD-MCNC: 11.6 G/DL
HGB BLD-MCNC: 11.7 G/DL
HGB BLD-MCNC: 11.7 G/DL
HGB BLD-MCNC: 11.8 G/DL
HGB BLD-MCNC: 11.9 G/DL
HGB BLD-MCNC: 12.2 G/DL
IMM GRANULOCYTES # BLD: 0.03 TH/MM3
IMM GRANULOCYTES # BLD: 0.04 TH/MM3
IMM GRANULOCYTES # BLD: 0.05 TH/MM3
IMM GRANULOCYTES # BLD: 0.06 TH/MM3
IMM GRANULOCYTES # BLD: 0.06 TH/MM3
IMM GRANULOCYTES NFR BLD: 0.8 %
IMM GRANULOCYTES NFR BLD: 1 %
IMM GRANULOCYTES NFR BLD: 1.1 %
IMM GRANULOCYTES NFR BLD: 1.1 %
IMM GRANULOCYTES NFR BLD: 1.2 %
IMM GRANULOCYTES NFR BLD: 1.3 %
LYMPHOCYTES # BLD: 1.02 TH/MM3
LYMPHOCYTES # BLD: 1.1 TH/MM3
LYMPHOCYTES # BLD: 1.14 TH/MM3
LYMPHOCYTES # BLD: 1.19 TH/MM3
LYMPHOCYTES # BLD: 1.19 TH/MM3
LYMPHOCYTES # BLD: 1.24 TH/MM3
LYMPHOCYTES # BLD: 1.38 TH/MM3
LYMPHOCYTES # BLD: 1.41 TH/MM3
LYMPHOCYTES # BLD: 1.44 TH/MM3
LYMPHOCYTES NFR BLD: 23.2 %
LYMPHOCYTES NFR BLD: 24.5 %
LYMPHOCYTES NFR BLD: 25.2 %
LYMPHOCYTES NFR BLD: 25.6 %
LYMPHOCYTES NFR BLD: 26.7 %
LYMPHOCYTES NFR BLD: 27.4 %
LYMPHOCYTES NFR BLD: 28.2 %
LYMPHOCYTES NFR BLD: 29 %
LYMPHOCYTES NFR BLD: 31.2 %
MAGNESIUM SERPL-MCNC: 2 MG/DL
MCH RBC QN AUTO: 30.5 PG
MCH RBC QN AUTO: 30.9 PG
MCH RBC QN AUTO: 31.5 PG
MCH RBC QN AUTO: 31.6 PG
MCH RBC QN AUTO: 32.1 PG
MCH RBC QN AUTO: 32.3 PG
MCH RBC QN AUTO: 32.3 PG
MCHC RBC AUTO-ENTMCNC: 33.5 G/DL
MCHC RBC AUTO-ENTMCNC: 33.9 G/DL
MCHC RBC AUTO-ENTMCNC: 34 G/DL
MCHC RBC AUTO-ENTMCNC: 34.1 G/DL
MCHC RBC AUTO-ENTMCNC: 34.3 G/DL
MCHC RBC AUTO-ENTMCNC: 34.4 G/DL
MCHC RBC AUTO-ENTMCNC: 34.4 G/DL
MCV RBC AUTO: 90.6 FL
MCV RBC AUTO: 91.1 FL
MCV RBC AUTO: 92.2 FL
MCV RBC AUTO: 92.7 FL
MCV RBC AUTO: 93.2 FL
MCV RBC AUTO: 93.9 FL
MCV RBC AUTO: 94.1 FL
MCV RBC AUTO: 94.2 FL
MCV RBC AUTO: 94.7 FL
MONOCYTES # BLD: 0.24 TH/MM3
MONOCYTES # BLD: 0.26 TH/MM3
MONOCYTES # BLD: 0.27 TH/MM3
MONOCYTES # BLD: 0.34 TH/MM3
MONOCYTES # BLD: 0.35 TH/MM3
MONOCYTES # BLD: 0.37 TH/MM3
MONOCYTES # BLD: 0.38 TH/MM3
MONOCYTES # BLD: 0.4 TH/MM3
MONOCYTES # BLD: 0.4 TH/MM3
MONOCYTES NFR BLD: 5 %
MONOCYTES NFR BLD: 6.3 %
MONOCYTES NFR BLD: 6.8 %
MONOCYTES NFR BLD: 8 %
MONOCYTES NFR BLD: 8.2 %
MONOCYTES NFR BLD: 8.2 %
MONOCYTES NFR BLD: 8.4 %
PLATELET # BLD: 230 TH/MM3
PLATELET # BLD: 242 TH/MM3
PLATELET # BLD: 271 TH/MM3
PLATELET # BLD: 281 TH/MM3
PLATELET # BLD: 285 TH/MM3
PLATELET # BLD: 291 TH/MM3
PLATELET # BLD: 295 TH/MM3
PLATELET # BLD: 302 TH/MM3
PLATELET # BLD: 310 TH/MM3
PMV BLD AUTO: 10 FL
PMV BLD AUTO: 10.1 FL
PMV BLD AUTO: 9.1 FL
PMV BLD AUTO: 9.4 FL
PMV BLD AUTO: 9.4 FL
PMV BLD AUTO: 9.5 FL
PMV BLD AUTO: 9.6 FL
PMV BLD AUTO: 9.6 FL
PMV BLD AUTO: 9.7 FL
POTASSIUM SERPL-SCNC: 3.7 MMOL/L
POTASSIUM SERPL-SCNC: 3.9 MMOL/L
PROT SERPL-MCNC: 6.2 G/DL
PROT SERPL-MCNC: 6.2 G/DL
RBC # BLD AUTO: 3.58 MIL/MM3
RBC # BLD AUTO: 3.61 MIL/MM3
RBC # BLD AUTO: 3.65 MIL/MM3
RBC # BLD AUTO: 3.68 MIL/MM3
RBC # BLD AUTO: 3.71 MIL/MM3
RBC # BLD AUTO: 3.72 MIL/MM3
RBC # BLD AUTO: 3.78 MIL/MM3
RBC # BLD AUTO: 3.82 MIL/MM3
RBC # BLD AUTO: 3.83 MIL/MM3
SODIUM SERPL-SCNC: 138 MEQ/L
SODIUM SERPL-SCNC: 140 MEQ/L
WBC # BLD: 3.82 TH/MM3
WBC # BLD: 3.97 TH/MM3
WBC # BLD: 4.53 TH/MM3
WBC # BLD: 4.6 TH/MM3
WBC # BLD: 4.65 TH/MM3
WBC # BLD: 4.76 TH/MM3
WBC # BLD: 5 TH/MM3
WBC # BLD: 5.14 TH/MM3
WBC # BLD: 5.25 TH/MM3

## 2017-08-30 ENCOUNTER — OUTPATIENT (OUTPATIENT)
Dept: OUTPATIENT SERVICES | Facility: HOSPITAL | Age: 42
LOS: 1 days | Discharge: HOME | End: 2017-08-30

## 2017-08-30 DIAGNOSIS — R92.2 INCONCLUSIVE MAMMOGRAM: ICD-10-CM

## 2017-08-30 DIAGNOSIS — Z85.3 PERSONAL HISTORY OF MALIGNANT NEOPLASM OF BREAST: ICD-10-CM

## 2017-08-30 DIAGNOSIS — Z12.31 ENCOUNTER FOR SCREENING MAMMOGRAM FOR MALIGNANT NEOPLASM OF BREAST: ICD-10-CM

## 2017-08-30 DIAGNOSIS — R92.8 OTHER ABNORMAL AND INCONCLUSIVE FINDINGS ON DIAGNOSTIC IMAGING OF BREAST: ICD-10-CM

## 2017-09-06 ENCOUNTER — APPOINTMENT (OUTPATIENT)
Dept: BREAST CENTER | Facility: CLINIC | Age: 42
End: 2017-09-06
Payer: COMMERCIAL

## 2017-09-06 VITALS
HEIGHT: 64 IN | DIASTOLIC BLOOD PRESSURE: 80 MMHG | WEIGHT: 186 LBS | BODY MASS INDEX: 31.76 KG/M2 | SYSTOLIC BLOOD PRESSURE: 110 MMHG

## 2017-09-06 PROCEDURE — 99213 OFFICE O/P EST LOW 20 MIN: CPT

## 2017-09-07 ENCOUNTER — RESULT REVIEW (OUTPATIENT)
Age: 42
End: 2017-09-07

## 2017-09-07 ENCOUNTER — APPOINTMENT (OUTPATIENT)
Dept: HEMATOLOGY ONCOLOGY | Facility: CLINIC | Age: 42
End: 2017-09-07

## 2017-09-07 VITALS
HEART RATE: 73 BPM | RESPIRATION RATE: 14 BRPM | TEMPERATURE: 97.5 F | HEIGHT: 64 IN | SYSTOLIC BLOOD PRESSURE: 140 MMHG | BODY MASS INDEX: 31.76 KG/M2 | DIASTOLIC BLOOD PRESSURE: 85 MMHG | WEIGHT: 186 LBS

## 2017-09-07 LAB
BASOPHILS # BLD: 0.02 TH/MM3
BASOPHILS NFR BLD: 0.4 %
EOSINOPHIL # BLD: 0.19 TH/MM3
EOSINOPHIL NFR BLD: 3.5 %
ERYTHROCYTE [DISTWIDTH] IN BLOOD BY AUTOMATED COUNT: 15.2 %
GRANULOCYTES # BLD: 3.02 TH/MM3
GRANULOCYTES NFR BLD: 54.9 %
HCT VFR BLD AUTO: 39.3 %
HGB BLD-MCNC: 13.1 G/DL
IMM GRANULOCYTES # BLD: 0.03 TH/MM3
IMM GRANULOCYTES NFR BLD: 0.5 %
LYMPHOCYTES # BLD: 1.59 TH/MM3
LYMPHOCYTES NFR BLD: 28.9 %
MCH RBC QN AUTO: 31.6 PG
MCHC RBC AUTO-ENTMCNC: 33.3 G/DL
MCV RBC AUTO: 94.9 FL
MONOCYTES # BLD: 0.65 TH/MM3
MONOCYTES NFR BLD: 11.8 %
PLATELET # BLD: 237 TH/MM3
PMV BLD AUTO: 10.1 FL
RBC # BLD AUTO: 4.14 MIL/MM3
WBC # BLD: 5.5 TH/MM3

## 2017-09-08 DIAGNOSIS — Z51.11 ENCOUNTER FOR ANTINEOPLASTIC CHEMOTHERAPY: ICD-10-CM

## 2017-09-08 DIAGNOSIS — C50.812 MALIGNANT NEOPLASM OF OVERLAPPING SITES OF LEFT FEMALE BREAST: ICD-10-CM

## 2017-09-11 LAB
ESTRADIOL FREE SERPL-MCNC: < 5 PG/ML
FSH SERPL-MCNC: 59.7 IU/L
LH SERPL-ACNC: 32 IU/L

## 2017-09-14 ENCOUNTER — APPOINTMENT (OUTPATIENT)
Dept: INFUSION THERAPY | Facility: CLINIC | Age: 42
End: 2017-09-14

## 2017-09-15 ENCOUNTER — APPOINTMENT (OUTPATIENT)
Dept: INFUSION THERAPY | Facility: CLINIC | Age: 42
End: 2017-09-15

## 2017-09-15 ENCOUNTER — RX RENEWAL (OUTPATIENT)
Age: 42
End: 2017-09-15

## 2017-09-18 ENCOUNTER — RX RENEWAL (OUTPATIENT)
Age: 42
End: 2017-09-18

## 2017-09-18 RX ORDER — RANITIDINE 150 MG/1
150 TABLET ORAL DAILY
Qty: 30 | Refills: 2 | Status: ACTIVE | COMMUNITY
Start: 2017-07-14 | End: 1900-01-01

## 2017-09-25 ENCOUNTER — RX RENEWAL (OUTPATIENT)
Age: 42
End: 2017-09-25

## 2017-10-12 ENCOUNTER — APPOINTMENT (OUTPATIENT)
Dept: HEMATOLOGY ONCOLOGY | Facility: CLINIC | Age: 42
End: 2017-10-12

## 2017-10-12 ENCOUNTER — APPOINTMENT (OUTPATIENT)
Dept: INFUSION THERAPY | Facility: CLINIC | Age: 42
End: 2017-10-12

## 2017-10-19 ENCOUNTER — APPOINTMENT (OUTPATIENT)
Dept: HEMATOLOGY ONCOLOGY | Facility: CLINIC | Age: 42
End: 2017-10-19

## 2017-10-19 VITALS
BODY MASS INDEX: 32.1 KG/M2 | WEIGHT: 188 LBS | SYSTOLIC BLOOD PRESSURE: 130 MMHG | TEMPERATURE: 97.9 F | DIASTOLIC BLOOD PRESSURE: 74 MMHG | HEIGHT: 64 IN | RESPIRATION RATE: 14 BRPM | HEART RATE: 82 BPM

## 2017-10-19 RX ORDER — PROCHLORPERAZINE MALEATE 10 MG/1
10 TABLET ORAL EVERY 6 HOURS
Qty: 30 | Refills: 3 | Status: DISCONTINUED | COMMUNITY
Start: 2017-01-16 | End: 2017-10-19

## 2017-10-19 RX ORDER — PENCICLOVIR 10 MG/G
1 CREAM TOPICAL
Qty: 2 | Refills: 0 | Status: DISCONTINUED | COMMUNITY
Start: 2017-05-15 | End: 2017-10-19

## 2017-10-19 RX ORDER — LIDOCAINE HYDROCHLORIDE 20 MG/ML
2 SOLUTION OROPHARYNGEAL
Qty: 300 | Refills: 1 | Status: DISCONTINUED | COMMUNITY
Start: 2017-05-15 | End: 2017-10-19

## 2017-10-19 RX ORDER — CEPHALEXIN 500 MG/1
500 CAPSULE ORAL
Qty: 21 | Refills: 0 | Status: DISCONTINUED | COMMUNITY
Start: 2017-05-24

## 2017-10-19 RX ORDER — ONDANSETRON 8 MG/1
8 TABLET ORAL EVERY 8 HOURS
Qty: 30 | Refills: 2 | Status: DISCONTINUED | COMMUNITY
Start: 2017-01-16 | End: 2017-10-19

## 2017-10-19 RX ORDER — GABAPENTIN 300 MG/1
300 CAPSULE ORAL
Qty: 30 | Refills: 1 | Status: ACTIVE | COMMUNITY
Start: 2017-10-19 | End: 1900-01-01

## 2017-10-19 RX ORDER — OXYCODONE AND ACETAMINOPHEN 5; 325 MG/1; MG/1
5-325 TABLET ORAL
Qty: 20 | Refills: 0 | Status: DISCONTINUED | COMMUNITY
Start: 2017-05-24

## 2017-10-19 RX ORDER — SULFAMETHOXAZOLE AND TRIMETHOPRIM 800; 160 MG/1; MG/1
800-160 TABLET ORAL
Qty: 60 | Refills: 0 | Status: DISCONTINUED | COMMUNITY
Start: 2017-03-08

## 2017-10-23 ENCOUNTER — EMERGENCY (EMERGENCY)
Facility: HOSPITAL | Age: 42
LOS: 0 days | Discharge: HOME | End: 2017-10-23

## 2017-10-23 DIAGNOSIS — K13.0 DISEASES OF LIPS: ICD-10-CM

## 2017-10-23 DIAGNOSIS — T42.6X5A ADVERSE EFFECT OF OTHER ANTIEPILEPTIC AND SEDATIVE-HYPNOTIC DRUGS, INITIAL ENCOUNTER: ICD-10-CM

## 2017-10-26 ENCOUNTER — APPOINTMENT (OUTPATIENT)
Dept: HEMATOLOGY ONCOLOGY | Facility: CLINIC | Age: 42
End: 2017-10-26

## 2017-10-26 VITALS
RESPIRATION RATE: 14 BRPM | HEART RATE: 70 BPM | TEMPERATURE: 97.7 F | WEIGHT: 188 LBS | SYSTOLIC BLOOD PRESSURE: 133 MMHG | BODY MASS INDEX: 32.1 KG/M2 | HEIGHT: 64 IN | DIASTOLIC BLOOD PRESSURE: 74 MMHG

## 2017-11-08 ENCOUNTER — OUTPATIENT (OUTPATIENT)
Dept: OUTPATIENT SERVICES | Facility: HOSPITAL | Age: 42
LOS: 1 days | Discharge: HOME | End: 2017-11-08

## 2017-11-08 DIAGNOSIS — C50.212 MALIGNANT NEOPLASM OF UPPER-INNER QUADRANT OF LEFT FEMALE BREAST: ICD-10-CM

## 2017-11-09 ENCOUNTER — APPOINTMENT (OUTPATIENT)
Dept: INFUSION THERAPY | Facility: CLINIC | Age: 42
End: 2017-11-09

## 2017-11-09 ENCOUNTER — APPOINTMENT (OUTPATIENT)
Dept: HEMATOLOGY ONCOLOGY | Facility: CLINIC | Age: 42
End: 2017-11-09

## 2017-12-04 ENCOUNTER — OUTPATIENT (OUTPATIENT)
Dept: OUTPATIENT SERVICES | Facility: HOSPITAL | Age: 42
LOS: 1 days | Discharge: HOME | End: 2017-12-04

## 2017-12-06 DIAGNOSIS — N95.9 UNSPECIFIED MENOPAUSAL AND PERIMENOPAUSAL DISORDER: ICD-10-CM

## 2017-12-06 DIAGNOSIS — Z13.820 ENCOUNTER FOR SCREENING FOR OSTEOPOROSIS: ICD-10-CM

## 2017-12-07 ENCOUNTER — APPOINTMENT (OUTPATIENT)
Dept: INFUSION THERAPY | Facility: CLINIC | Age: 42
End: 2017-12-07

## 2017-12-07 ENCOUNTER — OUTPATIENT (OUTPATIENT)
Dept: OUTPATIENT SERVICES | Facility: HOSPITAL | Age: 42
LOS: 1 days | Discharge: HOME | End: 2017-12-07

## 2017-12-07 DIAGNOSIS — C50.812 MALIGNANT NEOPLASM OF OVERLAPPING SITES OF LEFT FEMALE BREAST: ICD-10-CM

## 2017-12-07 DIAGNOSIS — G62.9 POLYNEUROPATHY, UNSPECIFIED: ICD-10-CM

## 2017-12-13 ENCOUNTER — APPOINTMENT (OUTPATIENT)
Dept: BREAST CENTER | Facility: CLINIC | Age: 42
End: 2017-12-13
Payer: COMMERCIAL

## 2017-12-13 VITALS
WEIGHT: 188 LBS | OXYGEN SATURATION: 97 % | SYSTOLIC BLOOD PRESSURE: 122 MMHG | HEART RATE: 71 BPM | HEIGHT: 64 IN | BODY MASS INDEX: 32.1 KG/M2 | DIASTOLIC BLOOD PRESSURE: 80 MMHG

## 2017-12-13 PROCEDURE — 99213 OFFICE O/P EST LOW 20 MIN: CPT

## 2017-12-22 ENCOUNTER — APPOINTMENT (OUTPATIENT)
Dept: HEMATOLOGY ONCOLOGY | Facility: CLINIC | Age: 42
End: 2017-12-22

## 2017-12-22 ENCOUNTER — APPOINTMENT (OUTPATIENT)
Dept: BREAST CENTER | Facility: CLINIC | Age: 42
End: 2017-12-22
Payer: COMMERCIAL

## 2017-12-22 VITALS
TEMPERATURE: 98 F | BODY MASS INDEX: 32.1 KG/M2 | HEIGHT: 64 IN | SYSTOLIC BLOOD PRESSURE: 134 MMHG | WEIGHT: 188 LBS | DIASTOLIC BLOOD PRESSURE: 84 MMHG | HEART RATE: 83 BPM | RESPIRATION RATE: 14 BRPM

## 2017-12-22 VITALS — BODY MASS INDEX: 32.1 KG/M2 | HEIGHT: 64 IN | WEIGHT: 188 LBS

## 2017-12-22 PROCEDURE — 99213 OFFICE O/P EST LOW 20 MIN: CPT

## 2017-12-29 ENCOUNTER — APPOINTMENT (OUTPATIENT)
Dept: PLASTIC SURGERY | Facility: CLINIC | Age: 42
End: 2017-12-29

## 2018-01-03 ENCOUNTER — RX RENEWAL (OUTPATIENT)
Age: 43
End: 2018-01-03

## 2018-01-04 ENCOUNTER — APPOINTMENT (OUTPATIENT)
Dept: INFUSION THERAPY | Facility: CLINIC | Age: 43
End: 2018-01-04

## 2018-01-19 ENCOUNTER — APPOINTMENT (OUTPATIENT)
Dept: BREAST CENTER | Facility: CLINIC | Age: 43
End: 2018-01-19
Payer: COMMERCIAL

## 2018-01-19 ENCOUNTER — APPOINTMENT (OUTPATIENT)
Dept: HEMATOLOGY ONCOLOGY | Facility: CLINIC | Age: 43
End: 2018-01-19

## 2018-01-19 VITALS
BODY MASS INDEX: 31.92 KG/M2 | HEIGHT: 64 IN | DIASTOLIC BLOOD PRESSURE: 82 MMHG | HEART RATE: 86 BPM | RESPIRATION RATE: 14 BRPM | SYSTOLIC BLOOD PRESSURE: 123 MMHG | WEIGHT: 187 LBS | TEMPERATURE: 98.6 F

## 2018-01-19 VITALS
BODY MASS INDEX: 32.1 KG/M2 | HEIGHT: 64 IN | DIASTOLIC BLOOD PRESSURE: 80 MMHG | SYSTOLIC BLOOD PRESSURE: 136 MMHG | WEIGHT: 188 LBS

## 2018-01-19 PROCEDURE — 99213 OFFICE O/P EST LOW 20 MIN: CPT

## 2018-01-23 ENCOUNTER — OUTPATIENT (OUTPATIENT)
Dept: OUTPATIENT SERVICES | Facility: HOSPITAL | Age: 43
LOS: 1 days | Discharge: HOME | End: 2018-01-23

## 2018-01-26 ENCOUNTER — OUTPATIENT (OUTPATIENT)
Dept: OUTPATIENT SERVICES | Facility: HOSPITAL | Age: 43
LOS: 1 days | Discharge: HOME | End: 2018-01-26

## 2018-01-26 DIAGNOSIS — Z01.818 ENCOUNTER FOR OTHER PREPROCEDURAL EXAMINATION: ICD-10-CM

## 2018-02-01 ENCOUNTER — APPOINTMENT (OUTPATIENT)
Dept: INFUSION THERAPY | Facility: CLINIC | Age: 43
End: 2018-02-01

## 2018-02-02 ENCOUNTER — OUTPATIENT (OUTPATIENT)
Dept: OUTPATIENT SERVICES | Facility: HOSPITAL | Age: 43
LOS: 1 days | Discharge: HOME | End: 2018-02-02

## 2018-02-02 ENCOUNTER — APPOINTMENT (OUTPATIENT)
Dept: BREAST CENTER | Facility: AMBULATORY SURGERY CENTER | Age: 43
End: 2018-02-02
Payer: COMMERCIAL

## 2018-02-02 DIAGNOSIS — D64.9 ANEMIA, UNSPECIFIED: ICD-10-CM

## 2018-02-02 DIAGNOSIS — K21.9 GASTRO-ESOPHAGEAL REFLUX DISEASE WITHOUT ESOPHAGITIS: ICD-10-CM

## 2018-02-02 DIAGNOSIS — F32.9 MAJOR DEPRESSIVE DISORDER, SINGLE EPISODE, UNSPECIFIED: ICD-10-CM

## 2018-02-02 DIAGNOSIS — E01.8 OTHER IODINE-DEFICIENCY RELATED THYROID DISORDERS AND ALLIED CONDITIONS: ICD-10-CM

## 2018-02-02 PROCEDURE — 36590 REMOVAL TUNNELED CV CATH: CPT

## 2018-02-05 ENCOUNTER — RX RENEWAL (OUTPATIENT)
Age: 43
End: 2018-02-05

## 2018-02-06 DIAGNOSIS — F32.9 MAJOR DEPRESSIVE DISORDER, SINGLE EPISODE, UNSPECIFIED: ICD-10-CM

## 2018-02-06 DIAGNOSIS — Z90.12 ACQUIRED ABSENCE OF LEFT BREAST AND NIPPLE: ICD-10-CM

## 2018-02-06 DIAGNOSIS — E03.9 HYPOTHYROIDISM, UNSPECIFIED: ICD-10-CM

## 2018-02-06 DIAGNOSIS — C50.912 MALIGNANT NEOPLASM OF UNSPECIFIED SITE OF LEFT FEMALE BREAST: ICD-10-CM

## 2018-02-06 DIAGNOSIS — E78.5 HYPERLIPIDEMIA, UNSPECIFIED: ICD-10-CM

## 2018-02-09 ENCOUNTER — APPOINTMENT (OUTPATIENT)
Dept: BREAST CENTER | Facility: CLINIC | Age: 43
End: 2018-02-09
Payer: COMMERCIAL

## 2018-02-09 VITALS
OXYGEN SATURATION: 98 % | BODY MASS INDEX: 31.92 KG/M2 | HEIGHT: 64 IN | WEIGHT: 187 LBS | DIASTOLIC BLOOD PRESSURE: 86 MMHG | HEART RATE: 110 BPM | SYSTOLIC BLOOD PRESSURE: 124 MMHG

## 2018-02-09 PROCEDURE — 99024 POSTOP FOLLOW-UP VISIT: CPT

## 2018-02-23 ENCOUNTER — LABORATORY RESULT (OUTPATIENT)
Age: 43
End: 2018-02-23

## 2018-02-23 ENCOUNTER — APPOINTMENT (OUTPATIENT)
Dept: HEMATOLOGY ONCOLOGY | Facility: CLINIC | Age: 43
End: 2018-02-23

## 2018-02-23 VITALS
TEMPERATURE: 97.1 F | SYSTOLIC BLOOD PRESSURE: 130 MMHG | DIASTOLIC BLOOD PRESSURE: 85 MMHG | HEIGHT: 64 IN | RESPIRATION RATE: 16 BRPM | WEIGHT: 187 LBS | BODY MASS INDEX: 31.92 KG/M2 | HEART RATE: 83 BPM

## 2018-03-01 ENCOUNTER — APPOINTMENT (OUTPATIENT)
Dept: INFUSION THERAPY | Facility: CLINIC | Age: 43
End: 2018-03-01

## 2018-03-01 LAB
ALBUMIN SERPL ELPH-MCNC: 4.5 G/DL
ALP BLD-CCNC: 91 U/L
ALT SERPL-CCNC: 35 U/L
ANION GAP SERPL CALC-SCNC: 14 MMOL/L
AST SERPL-CCNC: 26 U/L
BILIRUB SERPL-MCNC: 0.3 MG/DL
BUN SERPL-MCNC: 7 MG/DL
CALCIUM SERPL-MCNC: 10 MG/DL
CALCIUM SERPL-MCNC: 10 MG/DL
CANCER AG15-3 SERPL-ACNC: 16.7 U/ML
CEA SERPL-MCNC: 3 NG/ML
CHLORIDE SERPL-SCNC: 101 MMOL/L
CO2 SERPL-SCNC: 25 MMOL/L
CREAT SERPL-MCNC: 0.9 MG/DL
GLUCOSE SERPL-MCNC: 96 MG/DL
HCT VFR BLD CALC: 41.2 %
HGB BLD-MCNC: 13.6 G/DL
MCHC RBC-ENTMCNC: 30.4 PG
MCHC RBC-ENTMCNC: 33 G/DL
MCV RBC AUTO: 92 FL
PARATHYROID HORMONE INTACT: 29 PG/ML
PLATELET # BLD AUTO: 223 K/UL
PMV BLD: 9.5 FL
POTASSIUM SERPL-SCNC: 4.5 MMOL/L
PROT SERPL-MCNC: 7.3 G/DL
RBC # BLD: 4.48 M/UL
RBC # FLD: 12.3 %
SODIUM SERPL-SCNC: 140 MMOL/L
WBC # FLD AUTO: 4.84 K/UL

## 2018-03-06 ENCOUNTER — APPOINTMENT (OUTPATIENT)
Dept: HEMATOLOGY ONCOLOGY | Facility: CLINIC | Age: 43
End: 2018-03-06

## 2018-03-06 DIAGNOSIS — Z00.00 ENCOUNTER FOR GENERAL ADULT MEDICAL EXAMINATION W/OUT ABNORMAL FINDINGS: ICD-10-CM

## 2018-03-09 LAB
ESTRADIOL SERPL-MCNC: <5 PG/ML
FSH SERPL-MCNC: 35.1 IU/L
LH SERPL-ACNC: 22 IU/L

## 2018-03-14 ENCOUNTER — RX RENEWAL (OUTPATIENT)
Age: 43
End: 2018-03-14

## 2018-03-23 ENCOUNTER — APPOINTMENT (OUTPATIENT)
Dept: INFUSION THERAPY | Facility: CLINIC | Age: 43
End: 2018-03-23

## 2018-03-23 ENCOUNTER — APPOINTMENT (OUTPATIENT)
Dept: HEMATOLOGY ONCOLOGY | Facility: CLINIC | Age: 43
End: 2018-03-23

## 2018-03-23 VITALS
DIASTOLIC BLOOD PRESSURE: 90 MMHG | TEMPERATURE: 97.7 F | HEIGHT: 64 IN | RESPIRATION RATE: 16 BRPM | HEART RATE: 89 BPM | SYSTOLIC BLOOD PRESSURE: 136 MMHG | WEIGHT: 190 LBS | BODY MASS INDEX: 32.44 KG/M2

## 2018-03-23 RX ORDER — GOSERELIN ACETATE 10.8 MG/1
3.6 IMPLANT SUBCUTANEOUS ONCE
Qty: 0 | Refills: 0 | Status: COMPLETED | OUTPATIENT
Start: 2018-03-23 | End: 2018-03-23

## 2018-03-23 RX ADMIN — GOSERELIN ACETATE 3.6 MILLIGRAM(S): 10.8 IMPLANT SUBCUTANEOUS at 14:51

## 2018-04-09 ENCOUNTER — RX RENEWAL (OUTPATIENT)
Age: 43
End: 2018-04-09

## 2018-04-09 RX ORDER — ESOMEPRAZOLE MAGNESIUM 40 MG/1
40 CAPSULE, DELAYED RELEASE ORAL DAILY
Qty: 30 | Refills: 2 | Status: ACTIVE | COMMUNITY
Start: 2017-02-17 | End: 1900-01-01

## 2018-04-13 ENCOUNTER — APPOINTMENT (OUTPATIENT)
Dept: BREAST CENTER | Facility: CLINIC | Age: 43
End: 2018-04-13
Payer: COMMERCIAL

## 2018-04-13 VITALS — OXYGEN SATURATION: 98 % | HEART RATE: 78 BPM

## 2018-04-13 VITALS — WEIGHT: 190 LBS | HEIGHT: 64 IN | BODY MASS INDEX: 32.44 KG/M2

## 2018-04-13 PROCEDURE — 99213 OFFICE O/P EST LOW 20 MIN: CPT

## 2018-04-16 ENCOUNTER — RX RENEWAL (OUTPATIENT)
Age: 43
End: 2018-04-16

## 2018-04-20 ENCOUNTER — APPOINTMENT (OUTPATIENT)
Dept: HEMATOLOGY ONCOLOGY | Facility: CLINIC | Age: 43
End: 2018-04-20

## 2018-04-20 ENCOUNTER — APPOINTMENT (OUTPATIENT)
Dept: INFUSION THERAPY | Facility: CLINIC | Age: 43
End: 2018-04-20

## 2018-04-20 RX ORDER — GOSERELIN ACETATE 10.8 MG/1
3.6 IMPLANT SUBCUTANEOUS ONCE
Qty: 0 | Refills: 0 | Status: COMPLETED | OUTPATIENT
Start: 2018-04-20 | End: 2018-04-20

## 2018-04-20 RX ADMIN — GOSERELIN ACETATE 3.6 MILLIGRAM(S): 10.8 IMPLANT SUBCUTANEOUS at 11:40

## 2018-05-17 ENCOUNTER — RX RENEWAL (OUTPATIENT)
Age: 43
End: 2018-05-17

## 2018-05-17 RX ORDER — DOCUSATE SODIUM 100 MG/1
100 CAPSULE ORAL TWICE DAILY
Qty: 30 | Refills: 1 | Status: ACTIVE | COMMUNITY
Start: 2017-07-14 | End: 1900-01-01

## 2018-05-18 ENCOUNTER — APPOINTMENT (OUTPATIENT)
Dept: INFUSION THERAPY | Facility: CLINIC | Age: 43
End: 2018-05-18

## 2018-05-18 RX ORDER — GOSERELIN ACETATE 10.8 MG/1
3.6 IMPLANT SUBCUTANEOUS ONCE
Qty: 0 | Refills: 0 | Status: COMPLETED | OUTPATIENT
Start: 2018-05-18 | End: 2018-05-18

## 2018-05-18 RX ADMIN — GOSERELIN ACETATE 3.6 MILLIGRAM(S): 10.8 IMPLANT SUBCUTANEOUS at 12:51

## 2018-06-15 ENCOUNTER — LABORATORY RESULT (OUTPATIENT)
Age: 43
End: 2018-06-15

## 2018-06-15 ENCOUNTER — APPOINTMENT (OUTPATIENT)
Dept: HEMATOLOGY ONCOLOGY | Facility: CLINIC | Age: 43
End: 2018-06-15

## 2018-06-15 ENCOUNTER — APPOINTMENT (OUTPATIENT)
Dept: INFUSION THERAPY | Facility: CLINIC | Age: 43
End: 2018-06-15

## 2018-06-15 VITALS
DIASTOLIC BLOOD PRESSURE: 90 MMHG | RESPIRATION RATE: 16 BRPM | HEIGHT: 64 IN | WEIGHT: 190 LBS | HEART RATE: 87 BPM | TEMPERATURE: 98.5 F | SYSTOLIC BLOOD PRESSURE: 145 MMHG | BODY MASS INDEX: 32.44 KG/M2

## 2018-06-15 RX ORDER — GOSERELIN ACETATE 10.8 MG/1
3.6 IMPLANT SUBCUTANEOUS ONCE
Qty: 0 | Refills: 0 | Status: COMPLETED | OUTPATIENT
Start: 2018-06-15 | End: 2018-06-15

## 2018-06-15 RX ADMIN — GOSERELIN ACETATE 3.6 MILLIGRAM(S): 10.8 IMPLANT SUBCUTANEOUS at 13:32

## 2018-07-08 ENCOUNTER — EMERGENCY (EMERGENCY)
Facility: HOSPITAL | Age: 43
LOS: 0 days | Discharge: HOME | End: 2018-07-08
Admitting: PHYSICIAN ASSISTANT

## 2018-07-08 VITALS — DIASTOLIC BLOOD PRESSURE: 81 MMHG | SYSTOLIC BLOOD PRESSURE: 134 MMHG

## 2018-07-08 VITALS
RESPIRATION RATE: 18 BRPM | SYSTOLIC BLOOD PRESSURE: 165 MMHG | DIASTOLIC BLOOD PRESSURE: 85 MMHG | HEART RATE: 76 BPM | TEMPERATURE: 98 F

## 2018-07-08 DIAGNOSIS — Z03.89 ENCOUNTER FOR OBSERVATION FOR OTHER SUSPECTED DISEASES AND CONDITIONS RULED OUT: ICD-10-CM

## 2018-07-08 DIAGNOSIS — Z88.5 ALLERGY STATUS TO NARCOTIC AGENT: ICD-10-CM

## 2018-07-08 DIAGNOSIS — E03.9 HYPOTHYROIDISM, UNSPECIFIED: ICD-10-CM

## 2018-07-08 DIAGNOSIS — Z85.3 PERSONAL HISTORY OF MALIGNANT NEOPLASM OF BREAST: ICD-10-CM

## 2018-07-08 NOTE — ED PROVIDER NOTE - OBJECTIVE STATEMENT
43 yo female hx of HTN on Norvasc 5mg, Hypothyroid and left breast ca here for HTN. Patient states she came back from vacation yesterday and when she checked her BP this morning it was 184/101.  Patient denies HA, visual changes, CP, SOB, n/v, abdominal pains, or hematuria.

## 2018-07-08 NOTE — ED PROVIDER NOTE - PHYSICAL EXAMINATION
Gen: Alert, NAD, well appearing  Head: NC, AT, PERRL, EOMI, normal lids/conjunctiva  Neck: +supple, no tenderness/meningismus,  Pulm: Bilateral BS, normal resp effort  CV: RRR, no murmer  Abd: soft, NT/ND  Mskel: no edema/erythema/cyanosis  Skin: no rash, warm/dry  Neuro: AAOx3, no sensory/motor deficits

## 2018-07-08 NOTE — ED PROVIDER NOTE - NS ED ROS FT
Review of Systems    Constitutional: (-) fever  Eyes/ENT: (-) blurry vision, (-) epistaxis  Cardiovascular: (-) chest pain, (-) syncope  Respiratory: (-) cough, (-) shortness of breath  Gastrointestinal: (-) vomiting, (-) diarrhea  Musculoskeletal: (-) neck pain, (-) back pain, (-) joint pain  Integumentary: (-) rash, (-) edema  Neurological: (-) headache, (-) altered mental status

## 2018-07-17 ENCOUNTER — OUTPATIENT (OUTPATIENT)
Dept: OUTPATIENT SERVICES | Facility: HOSPITAL | Age: 43
LOS: 1 days | Discharge: HOME | End: 2018-07-17

## 2018-07-17 ENCOUNTER — APPOINTMENT (OUTPATIENT)
Dept: INFUSION THERAPY | Facility: CLINIC | Age: 43
End: 2018-07-17

## 2018-07-17 DIAGNOSIS — C50.812 MALIGNANT NEOPLASM OF OVERLAPPING SITES OF LEFT FEMALE BREAST: ICD-10-CM

## 2018-07-17 DIAGNOSIS — G62.9 POLYNEUROPATHY, UNSPECIFIED: ICD-10-CM

## 2018-07-17 RX ORDER — GOSERELIN ACETATE 10.8 MG/1
3.6 IMPLANT SUBCUTANEOUS ONCE
Qty: 0 | Refills: 0 | Status: COMPLETED | OUTPATIENT
Start: 2018-07-17 | End: 2018-07-17

## 2018-07-17 RX ADMIN — GOSERELIN ACETATE 3.6 MILLIGRAM(S): 10.8 IMPLANT SUBCUTANEOUS at 11:58

## 2018-08-11 LAB
ALBUMIN SERPL ELPH-MCNC: 4.5 G/DL
ALP BLD-CCNC: 85 U/L
ALT SERPL-CCNC: 29 U/L
ANION GAP SERPL CALC-SCNC: 17 MMOL/L
AST SERPL-CCNC: 27 U/L
BILIRUB SERPL-MCNC: 0.3 MG/DL
BUN SERPL-MCNC: 12 MG/DL
CALCIUM SERPL-MCNC: 9.7 MG/DL
CANCER AG15-3 SERPL-ACNC: 19.1 U/ML
CEA SERPL-MCNC: 3.2 NG/ML
CHLORIDE SERPL-SCNC: 99 MMOL/L
CO2 SERPL-SCNC: 25 MMOL/L
CREAT SERPL-MCNC: 1.1 MG/DL
ESTRADIOL SERPL-MCNC: 6 PG/ML
FSH SERPL-MCNC: 7.1 IU/L
GLUCOSE SERPL-MCNC: 80 MG/DL
HCT VFR BLD CALC: 40.7 %
HGB BLD-MCNC: 13.8 G/DL
LH SERPL-ACNC: <0.1 IU/L
MCHC RBC-ENTMCNC: 30.9 PG
MCHC RBC-ENTMCNC: 33.9 G/DL
MCV RBC AUTO: 91.1 FL
PLATELET # BLD AUTO: 266 K/UL
PMV BLD: 9.9 FL
POTASSIUM SERPL-SCNC: 4.4 MMOL/L
PROT SERPL-MCNC: 7.5 G/DL
RBC # BLD: 4.47 M/UL
RBC # FLD: 12 %
SODIUM SERPL-SCNC: 141 MMOL/L
WBC # FLD AUTO: 4.99 K/UL

## 2018-08-14 ENCOUNTER — APPOINTMENT (OUTPATIENT)
Dept: INFUSION THERAPY | Facility: CLINIC | Age: 43
End: 2018-08-14

## 2018-08-15 ENCOUNTER — APPOINTMENT (OUTPATIENT)
Dept: INFUSION THERAPY | Facility: CLINIC | Age: 43
End: 2018-08-15

## 2018-08-15 ENCOUNTER — OUTPATIENT (OUTPATIENT)
Dept: OUTPATIENT SERVICES | Facility: HOSPITAL | Age: 43
LOS: 1 days | Discharge: HOME | End: 2018-08-15

## 2018-08-15 RX ORDER — GOSERELIN ACETATE 10.8 MG/1
3.6 IMPLANT SUBCUTANEOUS ONCE
Qty: 0 | Refills: 0 | Status: COMPLETED | OUTPATIENT
Start: 2018-08-15 | End: 2018-08-15

## 2018-08-15 RX ADMIN — GOSERELIN ACETATE 3.6 MILLIGRAM(S): 10.8 IMPLANT SUBCUTANEOUS at 12:05

## 2018-08-16 DIAGNOSIS — C50.812 MALIGNANT NEOPLASM OF OVERLAPPING SITES OF LEFT FEMALE BREAST: ICD-10-CM

## 2018-08-22 ENCOUNTER — EMERGENCY (EMERGENCY)
Facility: HOSPITAL | Age: 43
LOS: 0 days | Discharge: HOME | End: 2018-08-22
Attending: STUDENT IN AN ORGANIZED HEALTH CARE EDUCATION/TRAINING PROGRAM | Admitting: STUDENT IN AN ORGANIZED HEALTH CARE EDUCATION/TRAINING PROGRAM

## 2018-08-22 VITALS
SYSTOLIC BLOOD PRESSURE: 151 MMHG | OXYGEN SATURATION: 99 % | RESPIRATION RATE: 18 BRPM | TEMPERATURE: 99 F | HEART RATE: 98 BPM | DIASTOLIC BLOOD PRESSURE: 90 MMHG

## 2018-08-22 VITALS — WEIGHT: 164.91 LBS

## 2018-08-22 DIAGNOSIS — I10 ESSENTIAL (PRIMARY) HYPERTENSION: ICD-10-CM

## 2018-08-22 DIAGNOSIS — F41.9 ANXIETY DISORDER, UNSPECIFIED: ICD-10-CM

## 2018-08-22 DIAGNOSIS — Z85.3 PERSONAL HISTORY OF MALIGNANT NEOPLASM OF BREAST: ICD-10-CM

## 2018-08-22 RX ORDER — AMLODIPINE BESYLATE 2.5 MG/1
1.5 TABLET ORAL
Qty: 0 | Refills: 0 | COMMUNITY

## 2018-08-22 NOTE — ED ADULT NURSE NOTE - NSIMPLEMENTINTERV_GEN_ALL_ED
Implemented All Universal Safety Interventions:  [place] to call system. Call bell, personal items and telephone within reach. Instruct patient to call for assistance. Room bathroom lighting operational. Non-slip footwear when patient is off stretcher. Physically safe environment: no spills, clutter or unnecessary equipment. Stretcher in lowest position, wheels locked, appropriate side rails in place.

## 2018-08-22 NOTE — ED PROVIDER NOTE - MEDICAL DECISION MAKING DETAILS
asymptomatic htn, no need to treat. encoraged close f/u w/ pcp for monitoring. continued psych f/u for anxiety. return precautions

## 2018-08-22 NOTE — ED ADULT NURSE NOTE - OBJECTIVE STATEMENT
c/o htn was seen at PMD and told to come to ED is systolic b/p over 150. Pt has anxiety from recent breast cancer remission and upcoming mammography

## 2018-08-22 NOTE — ED PROVIDER NOTE - PHYSICAL EXAMINATION
PHYSICAL EXAM:    Constitutional: awake, alert, NAD  Eyes: EOMI, no conj injection  HENT: NC AT  Back: no c/t/l spine ttp  Respiratory: no respiratory distress, breath sounds equal b/l, no wheezing, rhonchi or stridor.   Cardiovascular: RRR nml S1S2, radial pulses 2+ b/l  Gastrointestinal: soft, no masses, nontender, nondistended. No guarding or rebound.   Extremities: no peripheral edema  Neurological: AAOx3, CN II-XII grossly intact, no focal numbness or weakness, gait steady  Skin: no rash  Musculoskeletal: no gross deformity

## 2018-08-22 NOTE — ED ADULT NURSE NOTE - CHPI ED NUR SYMPTOMS NEG
no weakness/no pain/no decreased eating/drinking/no nausea/no chills/no dizziness/no tingling/no vomiting/no fever

## 2018-08-22 NOTE — ED ADULT NURSE NOTE - PMH
Hypertension, unspecified type    Malignant neoplasm of female breast, unspecified estrogen receptor status, unspecified laterality, unspecified site of breast

## 2018-08-22 NOTE — ED PROVIDER NOTE - OBJECTIVE STATEMENT
44 yo f hx br ca in remission x2 months, htn, anxiety here for htn. pt states she follows up with pcp for htn and has started norvasc 1.5 months ago and gradually titrated up.  pt had routine appointment with psych, who increased zoloft because of increasing anxiety for upcomming mammo. pt denies cp, dizziness, sob, f,c,decreased urine out. psych told patient to contact pcp office for managemnt of htn sbp 150s there. pcp was not in office today and nurse told pt to goto er. pt now only complaints of anxiety because htn is newish to her and she is not sure what to do.

## 2018-08-22 NOTE — ED PROVIDER NOTE - NS ED ROS FT
Constutional: no fever or rigors  Eyes: no eye redness, acute visual change  ENMT: no ear pain, no throat pain  Card: no chest pain, no palpitations  Pulm: no cough, no shortness of breath  GI: no abdominal pain, nausea or vomiting  : no dysuria or hematuria  MSK: no limitation in range of motion, no neck pain  Skin: no rash, no abrasion  Neuro: no numbness, no weakness  Heme/Onc: no easy bruising, no bleeding tendency   Allergic: no hives, no throat swelling

## 2018-08-28 PROBLEM — C50.919 MALIGNANT NEOPLASM OF UNSPECIFIED SITE OF UNSPECIFIED FEMALE BREAST: Chronic | Status: ACTIVE | Noted: 2018-08-22

## 2018-08-28 PROBLEM — I10 ESSENTIAL (PRIMARY) HYPERTENSION: Chronic | Status: ACTIVE | Noted: 2018-08-22

## 2018-09-12 ENCOUNTER — APPOINTMENT (OUTPATIENT)
Dept: BREAST CENTER | Facility: CLINIC | Age: 43
End: 2018-09-12

## 2018-09-14 ENCOUNTER — APPOINTMENT (OUTPATIENT)
Dept: INFUSION THERAPY | Facility: CLINIC | Age: 43
End: 2018-09-14

## 2018-09-14 ENCOUNTER — APPOINTMENT (OUTPATIENT)
Dept: HEMATOLOGY ONCOLOGY | Facility: CLINIC | Age: 43
End: 2018-09-14

## 2018-09-14 ENCOUNTER — LABORATORY RESULT (OUTPATIENT)
Age: 43
End: 2018-09-14

## 2018-09-14 VITALS
DIASTOLIC BLOOD PRESSURE: 87 MMHG | BODY MASS INDEX: 33.12 KG/M2 | WEIGHT: 194 LBS | HEART RATE: 84 BPM | HEIGHT: 64 IN | RESPIRATION RATE: 14 BRPM | TEMPERATURE: 98.31 F | SYSTOLIC BLOOD PRESSURE: 147 MMHG

## 2018-09-14 RX ORDER — GOSERELIN ACETATE 10.8 MG/1
3.6 IMPLANT SUBCUTANEOUS ONCE
Qty: 0 | Refills: 0 | Status: COMPLETED | OUTPATIENT
Start: 2018-09-14 | End: 2018-09-14

## 2018-09-14 RX ADMIN — GOSERELIN ACETATE 3.6 MILLIGRAM(S): 10.8 IMPLANT SUBCUTANEOUS at 12:47

## 2018-09-17 ENCOUNTER — FORM ENCOUNTER (OUTPATIENT)
Age: 43
End: 2018-09-17

## 2018-09-18 ENCOUNTER — OUTPATIENT (OUTPATIENT)
Dept: OUTPATIENT SERVICES | Facility: HOSPITAL | Age: 43
LOS: 1 days | Discharge: HOME | End: 2018-09-18

## 2018-09-18 DIAGNOSIS — Z12.31 ENCOUNTER FOR SCREENING MAMMOGRAM FOR MALIGNANT NEOPLASM OF BREAST: ICD-10-CM

## 2018-09-18 DIAGNOSIS — Z85.3 PERSONAL HISTORY OF MALIGNANT NEOPLASM OF BREAST: ICD-10-CM

## 2018-09-26 ENCOUNTER — APPOINTMENT (OUTPATIENT)
Dept: BREAST CENTER | Facility: CLINIC | Age: 43
End: 2018-09-26
Payer: COMMERCIAL

## 2018-09-26 VITALS
HEART RATE: 85 BPM | OXYGEN SATURATION: 99 % | DIASTOLIC BLOOD PRESSURE: 82 MMHG | HEIGHT: 64 IN | BODY MASS INDEX: 33.12 KG/M2 | WEIGHT: 194 LBS | SYSTOLIC BLOOD PRESSURE: 124 MMHG

## 2018-09-26 DIAGNOSIS — R92.8 OTHER ABNORMAL AND INCONCLUSIVE FINDINGS ON DIAGNOSTIC IMAGING OF BREAST: ICD-10-CM

## 2018-09-26 PROCEDURE — 99213 OFFICE O/P EST LOW 20 MIN: CPT

## 2018-09-27 PROBLEM — R92.8 ABNORMAL FINDING ON BREAST IMAGING: Status: ACTIVE | Noted: 2018-09-26

## 2018-10-12 ENCOUNTER — APPOINTMENT (OUTPATIENT)
Dept: INFUSION THERAPY | Facility: CLINIC | Age: 43
End: 2018-10-12

## 2018-10-12 RX ORDER — GOSERELIN ACETATE 10.8 MG/1
3.6 IMPLANT SUBCUTANEOUS ONCE
Qty: 0 | Refills: 0 | Status: COMPLETED | OUTPATIENT
Start: 2018-10-12 | End: 2018-10-12

## 2018-10-12 RX ADMIN — GOSERELIN ACETATE 3.6 MILLIGRAM(S): 10.8 IMPLANT SUBCUTANEOUS at 13:12

## 2018-11-09 ENCOUNTER — APPOINTMENT (OUTPATIENT)
Dept: INFUSION THERAPY | Facility: CLINIC | Age: 43
End: 2018-11-09

## 2018-11-14 ENCOUNTER — APPOINTMENT (OUTPATIENT)
Dept: INFUSION THERAPY | Facility: CLINIC | Age: 43
End: 2018-11-14

## 2018-11-14 RX ORDER — GOSERELIN ACETATE 10.8 MG/1
3.6 IMPLANT SUBCUTANEOUS ONCE
Qty: 0 | Refills: 0 | Status: COMPLETED | OUTPATIENT
Start: 2018-11-14 | End: 2018-11-14

## 2018-11-14 RX ADMIN — GOSERELIN ACETATE 3.6 MILLIGRAM(S): 10.8 IMPLANT SUBCUTANEOUS at 12:03

## 2018-12-14 ENCOUNTER — APPOINTMENT (OUTPATIENT)
Dept: INFUSION THERAPY | Facility: CLINIC | Age: 43
End: 2018-12-14

## 2018-12-14 ENCOUNTER — APPOINTMENT (OUTPATIENT)
Dept: HEMATOLOGY ONCOLOGY | Facility: CLINIC | Age: 43
End: 2018-12-14

## 2018-12-14 VITALS
DIASTOLIC BLOOD PRESSURE: 87 MMHG | HEART RATE: 86 BPM | HEIGHT: 64 IN | BODY MASS INDEX: 31.24 KG/M2 | WEIGHT: 183 LBS | SYSTOLIC BLOOD PRESSURE: 144 MMHG | TEMPERATURE: 98.8 F

## 2018-12-14 LAB
ALBUMIN SERPL ELPH-MCNC: 4.6 G/DL
ALP BLD-CCNC: 84 U/L
ALT SERPL-CCNC: 26 U/L
ANION GAP SERPL CALC-SCNC: 15 MMOL/L
AST SERPL-CCNC: 24 U/L
BILIRUB SERPL-MCNC: 0.3 MG/DL
BUN SERPL-MCNC: 9 MG/DL
CALCIUM SERPL-MCNC: 9.4 MG/DL
CANCER AG15-3 SERPL-ACNC: 19 U/ML
CEA SERPL-MCNC: 3 NG/ML
CHLORIDE SERPL-SCNC: 104 MMOL/L
CO2 SERPL-SCNC: 25 MMOL/L
CREAT SERPL-MCNC: 1 MG/DL
GLUCOSE SERPL-MCNC: 77 MG/DL
HCT VFR BLD CALC: 39.7 %
HGB BLD-MCNC: 13.6 G/DL
MCHC RBC-ENTMCNC: 31.6 PG
MCHC RBC-ENTMCNC: 34.3 G/DL
MCV RBC AUTO: 92.1 FL
PLATELET # BLD AUTO: 250 K/UL
PMV BLD: 9.5 FL
POTASSIUM SERPL-SCNC: 4.6 MMOL/L
PROT SERPL-MCNC: 7.5 G/DL
RBC # BLD: 4.31 M/UL
RBC # FLD: 12.2 %
SODIUM SERPL-SCNC: 144 MMOL/L
WBC # FLD AUTO: 4.33 K/UL

## 2018-12-14 RX ORDER — GOSERELIN ACETATE 10.8 MG/1
3.6 IMPLANT SUBCUTANEOUS ONCE
Qty: 0 | Refills: 0 | Status: COMPLETED | OUTPATIENT
Start: 2018-12-14 | End: 2018-12-14

## 2018-12-14 RX ADMIN — GOSERELIN ACETATE 3.6 MILLIGRAM(S): 10.8 IMPLANT SUBCUTANEOUS at 15:15

## 2018-12-16 ENCOUNTER — FORM ENCOUNTER (OUTPATIENT)
Age: 43
End: 2018-12-16

## 2018-12-16 NOTE — ASSESSMENT
[FreeTextEntry1] : 1. Stage IIA, m2 pT1C N1a M0, ER/UT positive, HER-2/amy negative invasive moderately differentiated ductal carcinoma of the left breast, status post left mastectomy, left axillary sentinel lymph node biopsy and left axillary lymph node dissection, s/p adjuvant chemo.\par 2. Chemotherapy-induced peripheral neuropathy.\par \par PLAN: \par -- Continue Exemestane. Zoladex injections monthly.  Given she requires Zoladex injections monthly for 5 years we advised her to consider oophorectomy. She is considering.  \par -- Continue annual screening mammo of the right breast.\par -- will Check CBC, CMP, CEA, CA 15-3 levels today.\par -- Anxiety/Depression: Currently on Prozac. Stable.\par -- Continue Calcium and vitamin D supplement.\par -- Followup visit in three months.\par \par

## 2018-12-16 NOTE — HISTORY OF PRESENT ILLNESS
[de-identified] : 42-year-old female is here for f/u visit.  She has stage IIA, m2 pT1C N1a M0, ER/DE positive, HER-2/amy negative invasive moderately differentiated ductal carcinoma of the left breast, status post left mastectomy, left axillary sentinel lymph node biopsy and left axillary lymph node dissection on November 16, 2016.  She completed adjuvant chemotherapy with 4 cycles of AC with dose reduction due to poor tolerance. She completed 12 weekly Taxol.  In 9/2017, she started on Exemestane and Zoladex as adjuvant endocrine therapy. She finished adjuvant radiotherapy. \par \par During her last visit, she complained increasing body aching since she started on Exemestane. She takes Tylenol for pain but it did not relieve her pain.\par   \par In 11/2017, bone density was normal.\par In 8/2017, right breast screening mammo was normal. [de-identified] : Today, she reports feeling well. Followed up with psychiatrist and she discontinued prozac and wellbutrin and currently on Lexapro and Effexor. Trying to wean off effexor because of constipation and she will stay on Lexapro. Discontinued Prozac approx 3-4 weeks ago. S/p port removal. \par \par She is taking exemestane currently. Did not receive Zoladex for last 2 months and muscle aches are better without Zoladex. \par \par 3/23/18:\par She restarted Prozac and Wellbutrin. She is doing better with these medications which she was on initially.  Continues to take Exemestane. Restarted Zoladex injections today. Last Zoladex injection was in Dec 2017. Continues to c/o numbness in hands and feet.\par \par 6/15/18:\par Doing well. On Prozac and Wellbutrin for anxiety/depression. On Exemestane and Zoladex injections monthly. C/o some worsening tingling sensation in the hands and feet, easy bruising and pain in the elbows. \par \par 09/14/2018\par She us here for a follow up visit. She remains on exemestane and Zoladex. She was recently diagnosed with high blood pressure and was given Norvasc. Her Prozac was increased to 80 mg. She does report bad hot flashes and arthralgias. Neuropathy is stable.\par She is scheduled to right breast US and mammogram 09/18/2018. \par offers no other complaints.\par \par 12/14/18;\par The patient is here for f/u and treatment. She has been taking exemestane daily and Zoladex injection monthly. She has right breast screening mammo in 9/2018. There was no suspicious finding. She had bone density in 12/2017 and it was normal. She feels well and does not have breast-related symptoms.

## 2018-12-16 NOTE — CONSULT LETTER
[Dear  ___] : Dear  [unfilled], [Courtesy Letter:] : I had the pleasure of seeing your patient, [unfilled], in my office today. [Please see my note below.] : Please see my note below. [Sincerely,] : Sincerely, [DrSe  ___] : Dr. WHALEY [DrSe ___] : Dr. WHALEY [FreeTextEntry3] : Aidan Rodas MD

## 2018-12-16 NOTE — REVIEW OF SYSTEMS
[Fatigue] : fatigue [Joint Pain] : joint pain [Joint Stiffness] : joint stiffness [Muscle Pain] : muscle pain [Easy Bruising] : a tendency for easy bruising [Negative] : Endocrine [de-identified] : numbness of hands and feet

## 2018-12-16 NOTE — PHYSICAL EXAM
[Fully active, able to carry on all pre-disease performance without restriction] : Status 0 - Fully active, able to carry on all pre-disease performance without restriction [Normal] : affect appropriate [de-identified] : The left side is status post mastectomy and implant was removed. The surgical scar is healing well.  Skin reaction to radiotherapy. There is no palpable abnormality in the right breast and the right axilla.

## 2018-12-17 ENCOUNTER — OUTPATIENT (OUTPATIENT)
Dept: OUTPATIENT SERVICES | Facility: HOSPITAL | Age: 43
LOS: 1 days | Discharge: HOME | End: 2018-12-17

## 2018-12-17 DIAGNOSIS — M79.89 OTHER SPECIFIED SOFT TISSUE DISORDERS: ICD-10-CM

## 2019-01-02 ENCOUNTER — OUTPATIENT (OUTPATIENT)
Dept: OUTPATIENT SERVICES | Facility: HOSPITAL | Age: 44
LOS: 1 days | Discharge: HOME | End: 2019-01-02

## 2019-01-11 ENCOUNTER — APPOINTMENT (OUTPATIENT)
Dept: HEMATOLOGY ONCOLOGY | Facility: CLINIC | Age: 44
End: 2019-01-11

## 2019-01-11 ENCOUNTER — APPOINTMENT (OUTPATIENT)
Dept: INFUSION THERAPY | Facility: CLINIC | Age: 44
End: 2019-01-11

## 2019-01-21 ENCOUNTER — APPOINTMENT (OUTPATIENT)
Dept: INFUSION THERAPY | Facility: CLINIC | Age: 44
End: 2019-01-21

## 2019-01-23 RX ORDER — EXEMESTANE 25 MG/1
25 TABLET, FILM COATED ORAL DAILY
Qty: 90 | Refills: 2 | Status: ACTIVE | COMMUNITY
Start: 2017-09-07 | End: 1900-01-01

## 2019-01-24 ENCOUNTER — FORM ENCOUNTER (OUTPATIENT)
Age: 44
End: 2019-01-24

## 2019-01-25 ENCOUNTER — OUTPATIENT (OUTPATIENT)
Dept: OUTPATIENT SERVICES | Facility: HOSPITAL | Age: 44
LOS: 1 days | Discharge: HOME | End: 2019-01-25

## 2019-01-25 DIAGNOSIS — R92.8 OTHER ABNORMAL AND INCONCLUSIVE FINDINGS ON DIAGNOSTIC IMAGING OF BREAST: ICD-10-CM

## 2019-01-28 ENCOUNTER — APPOINTMENT (OUTPATIENT)
Dept: PLASTIC SURGERY | Facility: CLINIC | Age: 44
End: 2019-01-28
Payer: COMMERCIAL

## 2019-01-28 ENCOUNTER — TRANSCRIPTION ENCOUNTER (OUTPATIENT)
Age: 44
End: 2019-01-28

## 2019-01-28 ENCOUNTER — APPOINTMENT (OUTPATIENT)
Dept: INFUSION THERAPY | Facility: CLINIC | Age: 44
End: 2019-01-28

## 2019-01-28 VITALS — HEIGHT: 64 IN | BODY MASS INDEX: 30.73 KG/M2 | WEIGHT: 180 LBS

## 2019-01-28 DIAGNOSIS — Z86.79 PERSONAL HISTORY OF OTHER DISEASES OF THE CIRCULATORY SYSTEM: ICD-10-CM

## 2019-01-28 DIAGNOSIS — Z87.19 PERSONAL HISTORY OF OTHER DISEASES OF THE DIGESTIVE SYSTEM: ICD-10-CM

## 2019-01-28 DIAGNOSIS — N64.89 OTHER SPECIFIED DISORDERS OF BREAST: ICD-10-CM

## 2019-01-28 DIAGNOSIS — Z90.10 OTHER SPECIFIED DISORDERS OF BREAST: ICD-10-CM

## 2019-01-28 DIAGNOSIS — Z78.9 OTHER SPECIFIED HEALTH STATUS: ICD-10-CM

## 2019-01-28 DIAGNOSIS — Z98.890 OTHER SPECIFIED POSTPROCEDURAL STATES: ICD-10-CM

## 2019-01-28 PROCEDURE — 99204 OFFICE O/P NEW MOD 45 MIN: CPT

## 2019-01-28 RX ORDER — NEOMYCIN/BACITRACIN/POLYMYXINB 3.5-400-5K
100 OINTMENT (GRAM) TOPICAL
Refills: 0 | Status: ACTIVE | COMMUNITY

## 2019-01-28 RX ORDER — AMLODIPINE BESYLATE 5 MG/1
5 TABLET ORAL
Refills: 0 | Status: ACTIVE | COMMUNITY

## 2019-01-28 RX ORDER — ASPIRIN/ACETAMINOPHEN/CAFFEINE 250-250-65
325 (65 FE) TABLET ORAL
Refills: 0 | Status: ACTIVE | COMMUNITY

## 2019-01-28 RX ORDER — EXEMESTANE 25 MG/1
25 TABLET, FILM COATED ORAL
Refills: 0 | Status: ACTIVE | COMMUNITY

## 2019-01-28 RX ORDER — DOCUSATE SODIUM 100 MG/1
100 CAPSULE ORAL
Refills: 0 | Status: ACTIVE | COMMUNITY

## 2019-01-28 RX ORDER — MELATONIN 10 MG
500-600 TABLET, SUBLINGUAL SUBLINGUAL
Refills: 0 | Status: ACTIVE | COMMUNITY

## 2019-01-28 RX ORDER — ALPRAZOLAM 0.5 MG/1
0.5 TABLET ORAL
Qty: 2 | Refills: 0 | Status: DISCONTINUED | COMMUNITY
Start: 2019-01-23 | End: 2019-01-28

## 2019-01-28 RX ORDER — GOSERELIN ACETATE 10.8 MG/1
3.6 IMPLANT SUBCUTANEOUS ONCE
Qty: 0 | Refills: 0 | Status: COMPLETED | OUTPATIENT
Start: 2019-01-28 | End: 2019-01-28

## 2019-01-28 RX ADMIN — GOSERELIN ACETATE 3.6 MILLIGRAM(S): 10.8 IMPLANT SUBCUTANEOUS at 10:37

## 2019-02-04 NOTE — ASSESSMENT
[FreeTextEntry1] : 42 yo F with h/o Left Breast CA s/p left mastectomy with axillary LN dissection and failed immediate implant reconstruction now with mastectomy defect considering breast reconstruction. \par s/p adjuvant chemotherapy and RT. \par \par She is a good candidate for delayed left breast reconstruction with ANDREW flap.\par \par - Recommend MRA chest, abdomen/pelvis to assess perforators and internal mammary recipient vessels \par - Extensive discussion was had about the ANDREW flap reconstruction and the relative contraindication to implant-based reconstruction given history of radiation \par \par -The patient was counseled about delayed breast reconstructive options including autologous, prosthetic, and the options of foregoing reconstruction. \par \par The patient was extensively counseled on the operation and the perioperative recoveries of both autologous and prosthetic reconstructions. The patient understands the likely position of scars and the use of surgical drains. The patient understands the risks with abdominally based microsurgical reconstruction including partial or total flap loss, revisit to the operating room in the nando-operative period, wound dehiscence, mastectomy skin flap necrosis, fat necrosis, abdominal wall morbidity (laxity, bulge, hernia), asymmetry, parasthesia, seroma, hematoma, and infection. She also understands the risks with implant-based reconstruction including hematoma, seroma, infection, implant malposition, extrusion, deflation, capsular contracture, mastectomy skin flap necrosis, wound dehiscence, asymmetry, paresthesia, small risk of breast-implant associated lymphoma,  likelihood of requiring additional procedures related to the implant over the course of her lifetime, possible need for additional surgery including revision and/or autologous tissue reconstruction (e.g. pedicled latissismus dorsi flap, TDAP flap, etc).   She was also informed on the use of biologic mesh, its benefits, risks and alternatives.\par \par She is a good candidate for autologous tissue breast reconstruction. After a long discussion she would like to proceed with ANDREW flap reconstruction.  Representative patient before and after photos were shown to the patient.  She was given the opportunity to ask questions; and all were answered to her satisfaction at this time.\par \par The patient understands all of these risks and she provides informed consent.\par \par Photos were taken.\par \par Her surgery date with be coordinated after MRA for surgical planning.

## 2019-02-04 NOTE — DATA REVIEWED
[FreeTextEntry1] : EXAM: MR BREAST WAW IC BI \par \par \par PROCEDURE DATE: 01/25/2019 \par \par \par INTERPRETATION: Clinical History / Reason for exam: Personal history of \par left breast cancer status post mastectomy. \par \par Technique: Breast MRI is performed at 1.5 T with the patient prone and the \par breasts in a dedicated breast coil. Following a 3 plane localizer, sagittal \par T1 weighted, fat-saturated T1 weighted and fat saturated T2-weighted \par sequence; dynamic contrast enhanced sagittal images; and delayed \par post-contrast axial fat-saturated T1 weighted images were obtained. 7.5 mL \par gadolinium contrast was injected and 0 mL was discarded. Subtraction and MIP \par images were reviewed. [employer]here software was used in interpretation. \par \par Comparison: MRI dated 10/10/2016 and most recent mammogram and ultrasound \par dated 9/18/2018. \par \par Findings: \par \par Amount of fibroglandular tissue: Heterogeneous fibroglandular tissue \par \par Background parenchymal enhancement: Minimal, Asymmetric; the patient is \par status post left mastectomy. \par \par RIGHT BREAST: \par No enhancing mass, architectural distortion, or suspicious area of \par enhancement is identified. Susceptibility artifact noted in the upper outer \par quadrant with no residual enhancement consistent with previous benign biopsy. \par \par There is no axillary adenopathy. \par \par LEFT BREAST: \par Status post left mastectomy. No suspicious enhancement noted in the \par mastectomy bed. \par \par The nipple and skin appear normal. \par There is no axillary adenopathy. \par \par The imaged portions of the chest and abdomen are unremarkable. \par \par Impression: \par \par No MR evidence of malignancy in the right breast. No suspicious enhancement \par noted in the left mastectomy bed. \par \par Recommendation: Unless otherwise indicated by clinical findings, annual \par screening mammography recommended. \par \par BI-RADS Category 2: Benign \par \par

## 2019-02-04 NOTE — HISTORY OF PRESENT ILLNESS
[FreeTextEntry1] : 44 yo F  with PMH of HTN, Hypothyroidism, GERD and Left Breast Ca diagnosed in 2016 s/p left mastectomy with SLNbx and axillary dissection in 2016 qwith Dr. Hayes. Patient underwent immediate reconstruction with silicone implant with Dr. Bermudez complicated by delayed wound healing, infection and exposed implant necessitating implant removal in May of 2017.  Patient is also s/p adjuvant chemotherapy with 4 cycles of AC, 12 weekly Taxol and is on Exemestane and Zoladex as adjuvant endocrine therapy. She also completed 6 weeks of adjuvant radiotherapy. Recent Breast MRI 19 unremarkable. \par \par Dr. Aguilar- Breast Surgery\par Dr. Rodas- Sohail/Onc\par Dr. 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 \par \par

## 2019-02-04 NOTE — PHYSICAL EXAM
[Bra Size: _______] : Bra Size: [unfilled] [de-identified] : well-developed pleasant female, AAOx3 [de-identified] : NC/AT [de-identified] : PERRL [de-identified] : supple [de-identified] : KAITYR [de-identified] : right upper chest incision with keloid formation  [de-identified] : left breast mastectomy defect with a well-healed transverse scar, skin hyperpigmentation secondary to radiation along the inferior aspect, well-healed axillary incision, no palpable nodules or LAD\par right breast soft, no nipple discharge or inversion, no palpable nodules or LAD  [de-identified] : soft, nontender, well-healed  scar, good donor site for ANDREW flap  [de-identified] : slight lymphedema left upper extremity extending from hand to axilla, no cellulitis

## 2019-02-13 DIAGNOSIS — I97.2 POSTMASTECTOMY LYMPHEDEMA SYNDROME: ICD-10-CM

## 2019-02-14 ENCOUNTER — APPOINTMENT (OUTPATIENT)
Dept: PLASTIC SURGERY | Facility: CLINIC | Age: 44
End: 2019-02-14

## 2019-02-25 ENCOUNTER — OUTPATIENT (OUTPATIENT)
Dept: OUTPATIENT SERVICES | Facility: HOSPITAL | Age: 44
LOS: 1 days | Discharge: HOME | End: 2019-02-25

## 2019-02-25 ENCOUNTER — APPOINTMENT (OUTPATIENT)
Dept: HEMATOLOGY ONCOLOGY | Facility: CLINIC | Age: 44
End: 2019-02-25

## 2019-02-25 ENCOUNTER — APPOINTMENT (OUTPATIENT)
Dept: INFUSION THERAPY | Facility: CLINIC | Age: 44
End: 2019-02-25

## 2019-02-25 ENCOUNTER — LABORATORY RESULT (OUTPATIENT)
Age: 44
End: 2019-02-25

## 2019-02-25 VITALS
WEIGHT: 180 LBS | BODY MASS INDEX: 30.73 KG/M2 | DIASTOLIC BLOOD PRESSURE: 80 MMHG | TEMPERATURE: 97.8 F | HEIGHT: 64 IN | HEART RATE: 79 BPM | RESPIRATION RATE: 14 BRPM | SYSTOLIC BLOOD PRESSURE: 141 MMHG

## 2019-02-25 DIAGNOSIS — C50.812 MALIGNANT NEOPLASM OF OVERLAPPING SITES OF LEFT FEMALE BREAST: ICD-10-CM

## 2019-02-25 RX ORDER — GOSERELIN ACETATE 10.8 MG/1
3.6 IMPLANT SUBCUTANEOUS ONCE
Qty: 0 | Refills: 0 | Status: COMPLETED | OUTPATIENT
Start: 2019-02-25 | End: 2019-04-05

## 2019-02-25 NOTE — ASSESSMENT
[FreeTextEntry1] : 1. Stage IIA, m2 pT1C N1a M0, ER/MA positive, HER-2/amy negative invasive moderately differentiated ductal carcinoma of the left breast, status post left mastectomy, left axillary sentinel lymph node biopsy and left axillary lymph node dissection, s/p adjuvant chemo.\par 2. Chemotherapy-induced peripheral neuropathy.\par \par PLAN: \par -- Continue Exemestane daily.. \par -- Zoladex injection today. \par -- Anxiety/Depression: Currently on Prozac.\par -- She is expected to be weaned off Prozac by the end of Feb 2019.  We will consider changing her from Exemestane to tamoxifen at next visit. She is made aware that Exemestane with OFS is better than Tamoxifen. She understands and would like to switch to Tamoxifen without OFS.\par -- Continue Calcium and vitamin D supplement.\par --Blood work ordered:  CBC, CMP, CEA , CA 15-3.\par -- Continue annual screening mammo of the right breast.\par --Follow up in one month with Dr. Rodas.\par \par Case was seen and discussed with Dr. Rodas who agreed with the assessment and plan.\par

## 2019-02-25 NOTE — PHYSICAL EXAM
[Fully active, able to carry on all pre-disease performance without restriction] : Status 0 - Fully active, able to carry on all pre-disease performance without restriction [Normal] : affect appropriate [de-identified] : The left side is status post mastectomy and implant was removed. The surgical scar is healed well.  Skin reaction to radiotherapy. There is no palpable abnormality in the right breast and the right axilla.

## 2019-02-25 NOTE — REVIEW OF SYSTEMS
[Fatigue] : fatigue [Joint Pain] : joint pain [Joint Stiffness] : joint stiffness [Muscle Pain] : muscle pain [Easy Bruising] : a tendency for easy bruising [Negative] : Endocrine [de-identified] : numbness of hands and feet

## 2019-02-25 NOTE — HISTORY OF PRESENT ILLNESS
[de-identified] : 42-year-old female is here for f/u visit.  She has stage IIA, m2 pT1C N1a M0, ER/KS positive, HER-2/amy negative invasive moderately differentiated ductal carcinoma of the left breast, status post left mastectomy, left axillary sentinel lymph node biopsy and left axillary lymph node dissection on November 16, 2016.  She completed adjuvant chemotherapy with 4 cycles of AC with dose reduction due to poor tolerance. She completed 12 weekly Taxol.  In 9/2017, she started on Exemestane and Zoladex as adjuvant endocrine therapy. She finished adjuvant radiotherapy. \par \par During her last visit, she complained increasing body aching since she started on Exemestane. She takes Tylenol for pain but it did not relieve her pain.\par   \par In 11/2017, bone density was normal.\par In 8/2017, right breast screening mammo was normal. [de-identified] : Today, she reports feeling well. Followed up with psychiatrist and she discontinued prozac and wellbutrin and currently on Lexapro and Effexor. Trying to wean off effexor because of constipation and she will stay on Lexapro. Discontinued Prozac approx 3-4 weeks ago. S/p port removal. \par \par She is taking exemestane currently. Did not receive Zoladex for last 2 months and muscle aches are better without Zoladex. \par \par 3/23/18:\par She restarted Prozac and Wellbutrin. She is doing better with these medications which she was on initially.  Continues to take Exemestane. Restarted Zoladex injections today. Last Zoladex injection was in Dec 2017. Continues to c/o numbness in hands and feet.\par \par 6/15/18:\par Doing well. On Prozac and Wellbutrin for anxiety/depression. On Exemestane and Zoladex injections monthly. C/o some worsening tingling sensation in the hands and feet, easy bruising and pain in the elbows. \par \par 09/14/2018\par She us here for a follow up visit. She remains on exemestane and Zoladex. She was recently diagnosed with high blood pressure and was given Norvasc. Her Prozac was increased to 80 mg. She does report bad hot flashes and arthralgias. Neuropathy is stable.\par She is scheduled to right breast US and mammogram 09/18/2018. \par offers no other complaints.\par \par 12/14/18;\par The patient is here for f/u and treatment. She has been taking exemestane daily and Zoladex injection monthly. She has right breast screening mammo in 9/2018. There was no suspicious finding. She had bone density in 12/2017 and it was normal. She feels well and does not have breast-related symptoms. \par \par 2/25/19\par Patient is here today for follow up visit and Zoladex injection.  She is taking exemestane daily sine 9/2017.  She would like to switch to Tamoxifen since she was discontinued on Wellbutrin last month and weaning off on Prozac 20 mg PO this month.  She wants to go back to work and avoid monthly Zoladex injections and oophorectomy.  She saw Dr. Horan recently and decided to hold off on the reconstruction. Last Mammo and US 9/18/18, MRI breast 1/2019 stable.

## 2019-03-08 ENCOUNTER — APPOINTMENT (OUTPATIENT)
Dept: INFUSION THERAPY | Facility: CLINIC | Age: 44
End: 2019-03-08

## 2019-03-08 ENCOUNTER — APPOINTMENT (OUTPATIENT)
Dept: HEMATOLOGY ONCOLOGY | Facility: CLINIC | Age: 44
End: 2019-03-08

## 2019-03-21 ENCOUNTER — EMERGENCY (EMERGENCY)
Facility: HOSPITAL | Age: 44
LOS: 0 days | Discharge: HOME | End: 2019-03-21
Attending: EMERGENCY MEDICINE | Admitting: EMERGENCY MEDICINE

## 2019-03-21 VITALS
HEART RATE: 83 BPM | SYSTOLIC BLOOD PRESSURE: 135 MMHG | DIASTOLIC BLOOD PRESSURE: 76 MMHG | RESPIRATION RATE: 18 BRPM | WEIGHT: 179.9 LBS | OXYGEN SATURATION: 100 % | HEIGHT: 63 IN | TEMPERATURE: 96 F

## 2019-03-21 DIAGNOSIS — Z79.899 OTHER LONG TERM (CURRENT) DRUG THERAPY: ICD-10-CM

## 2019-03-21 DIAGNOSIS — Z85.3 PERSONAL HISTORY OF MALIGNANT NEOPLASM OF BREAST: ICD-10-CM

## 2019-03-21 DIAGNOSIS — R55 SYNCOPE AND COLLAPSE: ICD-10-CM

## 2019-03-21 DIAGNOSIS — I10 ESSENTIAL (PRIMARY) HYPERTENSION: ICD-10-CM

## 2019-03-21 DIAGNOSIS — Z88.9 ALLERGY STATUS TO UNSPECIFIED DRUGS, MEDICAMENTS AND BIOLOGICAL SUBSTANCES: ICD-10-CM

## 2019-03-21 DIAGNOSIS — Z88.5 ALLERGY STATUS TO NARCOTIC AGENT: ICD-10-CM

## 2019-03-21 LAB
ALBUMIN SERPL ELPH-MCNC: 4.4 G/DL — SIGNIFICANT CHANGE UP (ref 3.5–5.2)
ALP SERPL-CCNC: 81 U/L — SIGNIFICANT CHANGE UP (ref 30–115)
ALT FLD-CCNC: 42 U/L — HIGH (ref 0–41)
ANION GAP SERPL CALC-SCNC: 13 MMOL/L — SIGNIFICANT CHANGE UP (ref 7–14)
AST SERPL-CCNC: 33 U/L — SIGNIFICANT CHANGE UP (ref 0–41)
BASOPHILS # BLD AUTO: 0.02 K/UL — SIGNIFICANT CHANGE UP (ref 0–0.2)
BASOPHILS NFR BLD AUTO: 0.3 % — SIGNIFICANT CHANGE UP (ref 0–1)
BILIRUB SERPL-MCNC: <0.2 MG/DL — SIGNIFICANT CHANGE UP (ref 0.2–1.2)
BUN SERPL-MCNC: 10 MG/DL — SIGNIFICANT CHANGE UP (ref 10–20)
CALCIUM SERPL-MCNC: 9.6 MG/DL — SIGNIFICANT CHANGE UP (ref 8.5–10.1)
CHLORIDE SERPL-SCNC: 104 MMOL/L — SIGNIFICANT CHANGE UP (ref 98–110)
CO2 SERPL-SCNC: 26 MMOL/L — SIGNIFICANT CHANGE UP (ref 17–32)
CREAT SERPL-MCNC: 1 MG/DL — SIGNIFICANT CHANGE UP (ref 0.7–1.5)
EOSINOPHIL # BLD AUTO: 0.19 K/UL — SIGNIFICANT CHANGE UP (ref 0–0.7)
EOSINOPHIL NFR BLD AUTO: 3.1 % — SIGNIFICANT CHANGE UP (ref 0–8)
GLUCOSE SERPL-MCNC: 98 MG/DL — SIGNIFICANT CHANGE UP (ref 70–99)
HCT VFR BLD CALC: 40.2 % — SIGNIFICANT CHANGE UP (ref 37–47)
HGB BLD-MCNC: 13.5 G/DL — SIGNIFICANT CHANGE UP (ref 12–16)
IMM GRANULOCYTES NFR BLD AUTO: 0.2 % — SIGNIFICANT CHANGE UP (ref 0.1–0.3)
LYMPHOCYTES # BLD AUTO: 1.67 K/UL — SIGNIFICANT CHANGE UP (ref 1.2–3.4)
LYMPHOCYTES # BLD AUTO: 27.3 % — SIGNIFICANT CHANGE UP (ref 20.5–51.1)
MCHC RBC-ENTMCNC: 31.1 PG — HIGH (ref 27–31)
MCHC RBC-ENTMCNC: 33.6 G/DL — SIGNIFICANT CHANGE UP (ref 32–37)
MCV RBC AUTO: 92.6 FL — SIGNIFICANT CHANGE UP (ref 81–99)
MONOCYTES # BLD AUTO: 0.63 K/UL — HIGH (ref 0.1–0.6)
MONOCYTES NFR BLD AUTO: 10.3 % — HIGH (ref 1.7–9.3)
NEUTROPHILS # BLD AUTO: 3.59 K/UL — SIGNIFICANT CHANGE UP (ref 1.4–6.5)
NEUTROPHILS NFR BLD AUTO: 58.8 % — SIGNIFICANT CHANGE UP (ref 42.2–75.2)
NRBC # BLD: 0 /100 WBCS — SIGNIFICANT CHANGE UP (ref 0–0)
PLATELET # BLD AUTO: 239 K/UL — SIGNIFICANT CHANGE UP (ref 130–400)
POTASSIUM SERPL-MCNC: 3.9 MMOL/L — SIGNIFICANT CHANGE UP (ref 3.5–5)
POTASSIUM SERPL-SCNC: 3.9 MMOL/L — SIGNIFICANT CHANGE UP (ref 3.5–5)
PROT SERPL-MCNC: 7 G/DL — SIGNIFICANT CHANGE UP (ref 6–8)
RBC # BLD: 4.34 M/UL — SIGNIFICANT CHANGE UP (ref 4.2–5.4)
RBC # FLD: 12.1 % — SIGNIFICANT CHANGE UP (ref 11.5–14.5)
SODIUM SERPL-SCNC: 143 MMOL/L — SIGNIFICANT CHANGE UP (ref 135–146)
TROPONIN T SERPL-MCNC: <0.01 NG/ML — SIGNIFICANT CHANGE UP
WBC # BLD: 6.11 K/UL — SIGNIFICANT CHANGE UP (ref 4.8–10.8)
WBC # FLD AUTO: 6.11 K/UL — SIGNIFICANT CHANGE UP (ref 4.8–10.8)

## 2019-03-21 NOTE — ED ADULT TRIAGE NOTE - NS ED NURSE AMBULANCES
Dual skin assessment done with Emily Sears RN at bedside report. 4 dermabonded lap sites to ABD, RUCHI drain to right abd.  3 20 g to L hand. Otherwise, skin intact. [facility]

## 2019-03-21 NOTE — ED PROVIDER NOTE - NS ED ROS FT
Eyes:  No visual changes, eye pain or discharge.  ENMT:  No hearing changes, pain, no sore throat or runny nose, no difficulty swallowing  Cardiac:  No chest pain, SOB or edema. No chest pain with exertion.  Respiratory:  No cough or respiratory distress. No hemoptysis. No history of asthma or RAD.  GI:  No nausea, vomiting, diarrhea or abdominal pain.  :  No dysuria, frequency or burning.  MS:  No myalgia, muscle weakness, joint pain or back pain.  Neuro:  Syncope. No numbness, weakness, HA.   Skin:  No skin rash.   Endocrine: H/o hypothyroidism. No polyuria, polydipsia.

## 2019-03-21 NOTE — ED PROVIDER NOTE - PHYSICAL EXAMINATION
Constitutional: Well developed, well nourished. NAD. Good general hygiene  Head: Atraumatic.  Eyes: PERRLA. EOMI without discomfort.   ENT: No tongue biting. No nasal discharge. Mucous membranes moist.  Neck: Supple. Painless ROM.  Cardiovascular: Regular rhythm. Regular rate. Normal S1 and S2. No murmurs. 2+ pulses in all extremities.   Pulmonary: Normal respiratory rate and effort. Lungs clear to auscultation bilaterally. No wheezing, rales, or rhonchi. Bilateral, equal lung expansion.   Abdominal: Soft. Nondistended. Nontender. No rebound or guarding.   Extremities. Pelvis stable. No lower extremity edema. Symmetric calves.  Skin: No rashes.   Neuro: AAOx3. No focal neurological deficits. CN2-12 intact. 5/5 strength all extremities. Sensation equal, intact all extremities. No dysmetria.   Psych: Normal mood. Normal affect.

## 2019-03-21 NOTE — ED PROVIDER NOTE - CLINICAL SUMMARY MEDICAL DECISION MAKING FREE TEXT BOX
I personally evaluated the patient. I reviewed the Resident’s or Physician Assistant’s note (as assigned above), and agree with the findings and plan except as documented in my note. labs and imaging reviewed, Patient will go home. I have fully discussed the medical management and delivery of care with the patient. I have discussed any available labs, imaging and treatment options with the patient. Patient confirms understanding and has been given detailed return precautions. Patient instructed to return to the ED should symptoms persist or worsen. Patient has demonstrated capacity and has verbalized understanding. Patient is well appearing upon discharge. Repeat neuro exam prior to discharge unremarkable

## 2019-03-21 NOTE — ED PROVIDER NOTE - PROGRESS NOTE DETAILS
EKG done. pending labs, CXR. Labs unremarkable, CXR unremarkable. EKG unremarkable. D/c w/ strict return precautions given -- if pt feels these sxs again, feels CP, SOB, HA, abdominal pain, inability tolerating po.

## 2019-03-21 NOTE — ED PROVIDER NOTE - ATTENDING CONTRIBUTION TO CARE
44 y/o female h/o HTN, breast CA in remission tx with oral chemo, hypothyroidism, anxiety p/w syncope. Patient syncopized after talking about college tuition with another person, felt a warm sensation over her body, no cp/sob, no n/v/d, no fever. NO loss of bowel or bladder control    CONSTITUTIONAL: Well-developed; well-nourished; in no acute distress. Sitting up and providing appropriate history and physical examination  SKIN: skin exam is warm and dry, no acute rash.  HEAD: Normocephalic; atraumatic.  EYES: PERRL, 3 mm bilateral, no nystagmus, EOM intact; conjunctiva and sclera clear.  ENT: No nasal discharge; airway clear.  NECK: Supple; non tender. + full passive ROM in all directions. No JVD  CARD: S1, S2 normal; no murmurs, gallops, or rubs. Regular rate and rhythm. + Symmetric Strong Pulses  RESP: No wheezes, rales or rhonchi. Good air movement bilaterally  ABD: soft; non-distended; non-tender. No Rebound, No Guarding, No signs of peritonitis, No CVA tenderness. No pulsatile abdominal mass. + Strong and Symmetric Pulses  EXT: Normal ROM. No clubbing, cyanosis or edema. Dp and Pt Pulses intact. Cap refill less than 3 seconds  NEURO: CN 2-12 intact, normal finger to nose, normal romberg, stable gait, no sensory or motor deficits, Alert, oriented, grossly unremarkable. No Focal deficits. GCS 15. NIH 0  PSYCH: Cooperative, appropriate.

## 2019-03-21 NOTE — ED PROVIDER NOTE - OBJECTIVE STATEMENT
42 y/o female h/o HTN, breast CA in remission tx with oral chemo, hypothyroidism, anxiety p/w syncope. Pt was at [employer] event with family, felt warm, nauseous, ringing in ears. Pt sat down, syncopised, had LOC for 5-10 secs as per daughter. This has happened once before. Denies fever, chills, HA, CP, SOB, abdominal pain, back pain prior to event or now. Denies tongue biting, incontinence, confusion upon waking up.

## 2019-03-25 ENCOUNTER — APPOINTMENT (OUTPATIENT)
Dept: INFUSION THERAPY | Facility: CLINIC | Age: 44
End: 2019-03-25

## 2019-03-25 ENCOUNTER — APPOINTMENT (OUTPATIENT)
Dept: HEMATOLOGY ONCOLOGY | Facility: CLINIC | Age: 44
End: 2019-03-25

## 2019-04-03 ENCOUNTER — APPOINTMENT (OUTPATIENT)
Dept: BREAST CENTER | Facility: CLINIC | Age: 44
End: 2019-04-03

## 2019-04-05 ENCOUNTER — APPOINTMENT (OUTPATIENT)
Dept: HEMATOLOGY ONCOLOGY | Facility: CLINIC | Age: 44
End: 2019-04-05

## 2019-04-05 ENCOUNTER — APPOINTMENT (OUTPATIENT)
Dept: INFUSION THERAPY | Facility: CLINIC | Age: 44
End: 2019-04-05

## 2019-04-05 VITALS
SYSTOLIC BLOOD PRESSURE: 145 MMHG | WEIGHT: 194 LBS | HEART RATE: 77 BPM | DIASTOLIC BLOOD PRESSURE: 95 MMHG | BODY MASS INDEX: 33.12 KG/M2 | TEMPERATURE: 97.8 F | HEIGHT: 64 IN | RESPIRATION RATE: 16 BRPM

## 2019-04-05 LAB
ALBUMIN SERPL ELPH-MCNC: 4.4 G/DL
ALP BLD-CCNC: 86 U/L
ALT SERPL-CCNC: 31 U/L
ANION GAP SERPL CALC-SCNC: 13 MMOL/L
AST SERPL-CCNC: 28 U/L
BILIRUB SERPL-MCNC: 0.2 MG/DL
BUN SERPL-MCNC: 9 MG/DL
CALCIUM SERPL-MCNC: 9.5 MG/DL
CANCER AG15-3 SERPL-ACNC: 15.4 U/ML
CEA SERPL-MCNC: 3.2 NG/ML
CHLORIDE SERPL-SCNC: 105 MMOL/L
CO2 SERPL-SCNC: 25 MMOL/L
CREAT SERPL-MCNC: 1 MG/DL
ESTRADIOL SERPL-MCNC: <5 PG/ML
FSH SERPL-MCNC: 5.6 IU/L
GLUCOSE SERPL-MCNC: 87 MG/DL
HCT VFR BLD CALC: 39.9 %
HGB BLD-MCNC: 13.5 G/DL
LH SERPL-ACNC: <0.3 IU/L
MCHC RBC-ENTMCNC: 31.2 PG
MCHC RBC-ENTMCNC: 33.8 G/DL
MCV RBC AUTO: 92.1 FL
PLATELET # BLD AUTO: 253 K/UL
PMV BLD: 9.4 FL
POTASSIUM SERPL-SCNC: 4 MMOL/L
PROT SERPL-MCNC: 7.2 G/DL
RBC # BLD: 4.33 M/UL
RBC # FLD: 12.1 %
SODIUM SERPL-SCNC: 143 MMOL/L
WBC # FLD AUTO: 5.55 K/UL

## 2019-04-05 RX ORDER — TAMOXIFEN CITRATE 20 MG/1
20 TABLET, FILM COATED ORAL
Qty: 30 | Refills: 6 | Status: ACTIVE | COMMUNITY
Start: 2019-04-05 | End: 1900-01-01

## 2019-04-05 RX ORDER — GOSERELIN ACETATE 10.8 MG/1
3.6 IMPLANT SUBCUTANEOUS ONCE
Qty: 0 | Refills: 0 | Status: COMPLETED | OUTPATIENT
Start: 2019-04-05 | End: 2019-04-05

## 2019-04-05 RX ADMIN — GOSERELIN ACETATE 3.6 MILLIGRAM(S): 10.8 IMPLANT SUBCUTANEOUS at 11:34

## 2019-04-05 RX ADMIN — GOSERELIN ACETATE 3.6 MILLIGRAM(S): 10.8 IMPLANT SUBCUTANEOUS at 16:52

## 2019-04-19 ENCOUNTER — TRANSCRIPTION ENCOUNTER (OUTPATIENT)
Age: 44
End: 2019-04-19

## 2019-05-03 ENCOUNTER — APPOINTMENT (OUTPATIENT)
Dept: INFUSION THERAPY | Facility: CLINIC | Age: 44
End: 2019-05-03

## 2019-05-13 ENCOUNTER — APPOINTMENT (OUTPATIENT)
Dept: INFUSION THERAPY | Facility: CLINIC | Age: 44
End: 2019-05-13

## 2019-05-13 RX ORDER — GOSERELIN ACETATE 10.8 MG/1
3.6 IMPLANT SUBCUTANEOUS ONCE
Refills: 0 | Status: COMPLETED | OUTPATIENT
Start: 2019-05-13 | End: 2019-05-13

## 2019-05-13 RX ADMIN — GOSERELIN ACETATE 3.6 MILLIGRAM(S): 10.8 IMPLANT SUBCUTANEOUS at 12:09

## 2019-06-10 ENCOUNTER — APPOINTMENT (OUTPATIENT)
Dept: INFUSION THERAPY | Facility: CLINIC | Age: 44
End: 2019-06-10

## 2019-06-10 RX ORDER — GOSERELIN ACETATE 10.8 MG/1
3.6 IMPLANT SUBCUTANEOUS ONCE
Refills: 0 | Status: COMPLETED | OUTPATIENT
Start: 2019-06-10 | End: 2019-06-10

## 2019-06-10 RX ADMIN — GOSERELIN ACETATE 3.6 MILLIGRAM(S): 10.8 IMPLANT SUBCUTANEOUS at 10:49

## 2019-06-19 ENCOUNTER — APPOINTMENT (OUTPATIENT)
Dept: BREAST CENTER | Facility: CLINIC | Age: 44
End: 2019-06-19
Payer: COMMERCIAL

## 2019-06-19 VITALS
WEIGHT: 194 LBS | DIASTOLIC BLOOD PRESSURE: 84 MMHG | BODY MASS INDEX: 33.12 KG/M2 | TEMPERATURE: 98.4 F | SYSTOLIC BLOOD PRESSURE: 132 MMHG | HEIGHT: 64 IN

## 2019-06-19 DIAGNOSIS — M79.89 OTHER SPECIFIED SOFT TISSUE DISORDERS: ICD-10-CM

## 2019-06-19 PROCEDURE — 99213 OFFICE O/P EST LOW 20 MIN: CPT

## 2019-06-19 NOTE — REVIEW OF SYSTEMS
[Fever] : no fever [Chills] : no chills [Feeling Poorly] : not feeling poorly [Feeling Tired] : not feeling tired [Abn Vaginal Bleeding] : no unexplained vaginal bleeding [Joint Pain] : joint pain [Skin Lesions] : no skin lesions [Skin Wound] : no skin wound [Breast Pain] : no breast pain [Breast Lump] : no breast lump [As Noted in HPI] : as noted in HPI [Hot Flashes] : hot flashes [Negative] : Psychiatric [FreeTextEntry8] : her menses has not returned

## 2019-06-19 NOTE — REASON FOR VISIT
[Follow-Up: _____] : a [unfilled] follow-up visit [FreeTextEntry1] : h/o left breast cancer; imaging review.

## 2019-06-19 NOTE — PAST MEDICAL HISTORY
[Perimenopausal] : The patient is perimenopausal [Menarche Age ____] : age at menarche was [unfilled] [History of Hormone Replacement Treatment] : has no history of hormone replacement treatment [Total Preg ___] : G[unfilled] [Live Births ___] : P[unfilled]  [Multiple Births ___] :  multiple birth pregnancies: [unfilled] [Age At Live Birth ___] : Age at live birth: [unfilled] [de-identified] : LMP >1 year prior  [FreeTextEntry6] : Yes, clomid [FreeTextEntry7] : Yes., x 5 years, stopped >13 years prior  [FreeTextEntry8] : No.

## 2019-06-19 NOTE — DATA REVIEWED
[FreeTextEntry1] : EXAM: MR BREAST WAW IC BI \par \par \par PROCEDURE DATE: 01/25/2019 \par \par \par \par \par INTERPRETATION: Clinical History / Reason for exam: Personal history of \par left breast cancer status post mastectomy. \par \par Technique: Breast MRI is performed at 1.5 T with the patient prone and the \par breasts in a dedicated breast coil. Following a 3 plane localizer, sagittal \par T1 weighted, fat-saturated T1 weighted and fat saturated T2-weighted \par sequence; dynamic contrast enhanced sagittal images; and delayed \par post-contrast axial fat-saturated T1 weighted images were obtained. 7.5 mL \par gadolinium contrast was injected and 0 mL was discarded. Subtraction and MIP \par images were reviewed. e-contratos software was used in interpretation. \par \par Comparison: MRI dated 10/10/2016 and most recent mammogram and ultrasound \par dated 9/18/2018. \par \par Findings: \par \par Amount of fibroglandular tissue: Heterogeneous fibroglandular tissue \par \par Background parenchymal enhancement: Minimal, Asymmetric; the patient is \par status post left mastectomy. \par \par RIGHT BREAST: \par No enhancing mass, architectural distortion, or suspicious area of \par enhancement is identified. Susceptibility artifact noted in the upper outer \par quadrant with no residual enhancement consistent with previous benign biopsy. \par \par There is no axillary adenopathy. \par \par LEFT BREAST: \par Status post left mastectomy. No suspicious enhancement noted in the \par mastectomy bed. \par \par The nipple and skin appear normal. \par There is no axillary adenopathy. \par \par The imaged portions of the chest and abdomen are unremarkable. \par \par Impression: \par \par No MR evidence of malignancy in the right breast. No suspicious enhancement \par noted in the left mastectomy bed. \par \par Recommendation: Unless otherwise indicated by clinical findings, annual \par screening mammography recommended. \par \par BI-RADS Category 2: Benign \par \par \par \par \par \par \par RICHARD SHETTY M.D., ATTENDING RADIOLOGIST \par This document has been electronically signed. Jan 25 2019 3:47PM \par \par \par

## 2019-06-19 NOTE — HISTORY OF PRESENT ILLNESS
[FreeTextEntry1] : Problem List:\par 1. Stage IIA (pT1c pN1a MX) multicentric Left breast IDC w/ DCIS.\par 2. s/p Left SSM/SNB with immediate reconstruction - 11/16/16.\par 3. Dr. Bermudez - removal of failed left breast implant 5/2017 \par 4. Dr. Rodas - (+) chemo (A/C followed by Taxol); on endocrine tx (exemestane + zoladex) since 9/2017, switched to tamoxifen + zoladex 2019\par 5. Dr. Olvera - (+) PMRT - from 9/25/17-11/8/17\par 6. s/p R chemoport removal 2/2/18\par 7. R screening mammogram due 9/18/19\par \par MICHELLE FRAUSTO is a 43 year old female patient who presents today in follow up for left breast cancer.  She is status post left SSM in November 2016.  At that time she underwent immediate reconstruction, she unfortunately had complications w/ recurrent infection and implant was eventually removed. \par \par INTERVAL HISTORY: \inessa Nicolas returns for a 6 month follow up appointment.\par \par From a cancer perspective, she was switched from exemestane to tamoxifen + zoladex.  She has also stopped taking her anti-anxiety and depression medications.  She does not feel that much of a difference since switching medications, however.  She had a b/l breast MRI on 1/25/19 which was unrevealing for any suspicious enhancement.  Her last R screening mammogram/US was on 9/18/18 which was negative for any suspicious findings.  \par \par She has decided to hold off on the left breast reconstruction surgery. \par \par OF note, she had a R MRI guided biopsy prior to her surgery on 10/21/16 which yielded benign fibrocystic changes, f/up MRI did not reveal any residual enhancement, deemed benign.

## 2019-06-19 NOTE — ASSESSMENT
[FreeTextEntry1] : MICHELLE is a 43 year old patient who presented today in follow up for left breast cancer, s/p L SSM and SLN Bx 11/16/16 for a [id number], SBR 2, ER/SC positive, her 2 neg invasive ductal carcinoma.\par \par As a review: she has completed chemotherapy and PMRT, and has been switched from exemestane to tamoxifen with zoladex this year, (total endocrine therapy: since 9/2017.)  \par \par There was no evidence of disease recurrence on physical exam today.  Her most recent imaging was a b/l breast MRI on 1/25/19 which was unrevealing for any suspicious areas of enhancement.  A R dx mammogram and US will be due on 9/2019.  Ths will be scheduled for her today. \par \par -She is tolerating her endocrine therapy and should continue with endocrine therapy under the direction of Dr. Rodas\par \par In regards to a delayed reconstruction, she has met with Dr. Horan from plastic surgery, but has decided not to pursue reconstruction at this time.  She will let me know when and if she would like reconstruction.  In the meantime, I have given her a prescription for a post mastectomy bra and prosthesis.  \par \par We discussed dense breasts.  Increasing breast density has been found to increase one risk of breast cancer, but at this time, there is no clear indication for additional imaging in this setting, as both US and MRI have not been found to improve survival.  One can consider bilateral screening US in this setting.  However, out of 1000 women screened, the use of routine US will only identify an additional 3-4 cancers.  The use of US was found to increase the likelihood of undergoing more imaging and more biopsies.  She does have dense breasts.  We have decided to proceed with screening breast US at this time, and alternating breast MRIs with mammograms. \par \par -all other questions were answered\par -she knows to call with any other concerns. \par \par PLAN\par -R dx mammogram/US due on 9/18/19\par -f/up after

## 2019-06-19 NOTE — PHYSICAL EXAM
[Normocephalic] : normocephalic [Atraumatic] : atraumatic [EOMI] : extra ocular movement intact [No Cervical Adenopathy] : no cervical adenopathy [No Supraclavicular Adenopathy] : no supraclavicular adenopathy [No dominant masses] : no dominant masses in right breast  [No dominant masses] : no dominant masses left breast [No Nipple Retraction] : no right nipple retraction [No Axillary Lymphadenopathy] : no right axillary lymphadenopathy [No Nipple Discharge] : no right nipple discharge [Not Tender] : non-tender [Soft] : abdomen soft [No Rashes] : no rashes [No Edema] : no edema [No Ulceration] : no ulceration [de-identified] : no discrete masses, but large areas of soft tissue in the medial breast, no nodules or evidence of disease recurrence at this time  [de-identified] : dense breast tissue, no suspicious masses palpated within the breast \par R chemoport incision is well healed

## 2019-06-26 ENCOUNTER — CHART COPY (OUTPATIENT)
Age: 44
End: 2019-06-26

## 2019-07-10 ENCOUNTER — APPOINTMENT (OUTPATIENT)
Dept: INFUSION THERAPY | Facility: CLINIC | Age: 44
End: 2019-07-10

## 2019-07-10 ENCOUNTER — APPOINTMENT (OUTPATIENT)
Dept: HEMATOLOGY ONCOLOGY | Facility: CLINIC | Age: 44
End: 2019-07-10
Payer: COMMERCIAL

## 2019-07-10 ENCOUNTER — LABORATORY RESULT (OUTPATIENT)
Age: 44
End: 2019-07-10

## 2019-07-10 VITALS
SYSTOLIC BLOOD PRESSURE: 143 MMHG | DIASTOLIC BLOOD PRESSURE: 79 MMHG | HEART RATE: 88 BPM | BODY MASS INDEX: 33.47 KG/M2 | WEIGHT: 195 LBS | TEMPERATURE: 98.9 F

## 2019-07-10 DIAGNOSIS — Z79.810 ENCOUNTER FOR THERAPEUTIC DRUG LVL MONITORING: ICD-10-CM

## 2019-07-10 DIAGNOSIS — Z51.81 ENCOUNTER FOR THERAPEUTIC DRUG LVL MONITORING: ICD-10-CM

## 2019-07-10 DIAGNOSIS — M79.671 PAIN IN RIGHT LEG: ICD-10-CM

## 2019-07-10 DIAGNOSIS — M79.605 PAIN IN RIGHT LEG: ICD-10-CM

## 2019-07-10 DIAGNOSIS — M79.672 PAIN IN RIGHT LEG: ICD-10-CM

## 2019-07-10 DIAGNOSIS — M79.604 PAIN IN RIGHT LEG: ICD-10-CM

## 2019-07-10 PROCEDURE — 99214 OFFICE O/P EST MOD 30 MIN: CPT

## 2019-07-10 RX ORDER — GOSERELIN ACETATE 10.8 MG/1
3.6 IMPLANT SUBCUTANEOUS ONCE
Refills: 0 | Status: COMPLETED | OUTPATIENT
Start: 2019-07-10 | End: 2019-07-10

## 2019-07-10 RX ADMIN — GOSERELIN ACETATE 3.6 MILLIGRAM(S): 10.8 IMPLANT SUBCUTANEOUS at 15:30

## 2019-07-12 ENCOUNTER — FORM ENCOUNTER (OUTPATIENT)
Age: 44
End: 2019-07-12

## 2019-07-12 ENCOUNTER — APPOINTMENT (OUTPATIENT)
Dept: HEMATOLOGY ONCOLOGY | Facility: CLINIC | Age: 44
End: 2019-07-12

## 2019-07-13 ENCOUNTER — OUTPATIENT (OUTPATIENT)
Dept: OUTPATIENT SERVICES | Facility: HOSPITAL | Age: 44
LOS: 1 days | Discharge: HOME | End: 2019-07-13
Payer: COMMERCIAL

## 2019-07-13 DIAGNOSIS — M79.605 PAIN IN LEFT LEG: ICD-10-CM

## 2019-07-13 DIAGNOSIS — M79.672 PAIN IN LEFT FOOT: ICD-10-CM

## 2019-07-13 DIAGNOSIS — M79.604 PAIN IN RIGHT LEG: ICD-10-CM

## 2019-07-13 DIAGNOSIS — M79.671 PAIN IN RIGHT FOOT: ICD-10-CM

## 2019-07-13 PROCEDURE — 93970 EXTREMITY STUDY: CPT | Mod: 26

## 2019-07-21 PROBLEM — M79.604 BILATERAL LEG AND FOOT PAIN: Status: ACTIVE | Noted: 2019-07-10

## 2019-07-21 PROBLEM — Z51.81 ENCOUNTER FOR MONITORING TAMOXIFEN THERAPY: Status: ACTIVE | Noted: 2019-07-21

## 2019-07-21 NOTE — ASSESSMENT
[FreeTextEntry1] : 1. Stage IIA, m2 pT1C N1a M0, ER/VA positive, HER-2/amy negative invasive moderately differentiated ductal carcinoma of the left breast, status post left mastectomy, left axillary sentinel lymph node biopsy and left axillary lymph node dissection, s/p adjuvant chemo.\par 2. Chemotherapy-induced peripheral neuropathy.\par \par PLAN: \par -- Since she has been off antidepressant, she will switch back to Tamoxifen and stop Zoladex injection. She understand that AI plus OFS is better than Tamoxifen alone. She is thinking to have prophylactic BSO.. \par -- Continue Calcium and vitamin D supplement.\par --Blood work ordered:  CBC, CMP, CEA , CA 15-3.\par -- Continue annual screening mammo of the right breast.\par -- RTO for Follow up in 3 months.\par \par \par

## 2019-07-21 NOTE — ASSESSMENT
[FreeTextEntry1] : 1. Stage IIA, m2 pT1C N1a M0, ER/RI positive, HER-2/amy negative invasive moderately differentiated ductal carcinoma of the left breast, status post left mastectomy, left axillary sentinel lymph node biopsy and left axillary lymph node dissection, s/p adjuvant chemo.\par 2. Chemotherapy-induced peripheral neuropathy.\par \par PLAN: \par -- Continue Tamoxifen. Discussed prophylactic BSO. She is thinking about. She was informed that AI plus OFS is better than Tamoxifen alone. . \par -- Continue Calcium and vitamin D supplement.\par -- Blood work ordered:  CBC, CMP, CEA , CA 15-3.\par -- Continue annual screening mammo of the right breast.\par -- RTO for Follow up in 6 months.\par \par \par

## 2019-07-21 NOTE — PHYSICAL EXAM
[Fully active, able to carry on all pre-disease performance without restriction] : Status 0 - Fully active, able to carry on all pre-disease performance without restriction [Normal] : grossly intact [de-identified] : The left side is status post mastectomy and implant was removed. The surgical scar is healed well.  Skin reaction to radiotherapy. There is no palpable abnormality in the right breast and the right axilla.

## 2019-07-21 NOTE — HISTORY OF PRESENT ILLNESS
[de-identified] : Today, she reports feeling well. Followed up with psychiatrist and she discontinued prozac and wellbutrin and currently on Lexapro and Effexor. Trying to wean off effexor because of constipation and she will stay on Lexapro. Discontinued Prozac approx 3-4 weeks ago. S/p port removal. \par \par She is taking exemestane currently. Did not receive Zoladex for last 2 months and muscle aches are better without Zoladex. \par \par 3/23/18:\par She restarted Prozac and Wellbutrin. She is doing better with these medications which she was on initially.  Continues to take Exemestane. Restarted Zoladex injections today. Last Zoladex injection was in Dec 2017. Continues to c/o numbness in hands and feet.\par \par 6/15/18:\par Doing well. On Prozac and Wellbutrin for anxiety/depression. On Exemestane and Zoladex injections monthly. C/o some worsening tingling sensation in the hands and feet, easy bruising and pain in the elbows. \par \par 09/14/2018\par She us here for a follow up visit. She remains on exemestane and Zoladex. She was recently diagnosed with high blood pressure and was given Norvasc. Her Prozac was increased to 80 mg. She does report bad hot flashes and arthralgias. Neuropathy is stable.\par She is scheduled to right breast US and mammogram 09/18/2018. \par offers no other complaints.\par \par 12/14/18;\par The patient is here for f/u and treatment. She has been taking exemestane daily and Zoladex injection monthly. She has right breast screening mammo in 9/2018. There was no suspicious finding. She had bone density in 12/2017 and it was normal. She feels well and does not have breast-related symptoms. \par \par 2/25/19\par Patient is here today for follow up visit and Zoladex injection.  She is taking exemestane daily sine 9/2017.  She would like to switch to Tamoxifen since she was discontinued on Wellbutrin last month and weaning off on Prozac 20 mg PO this month.  She wants to go back to work and avoid monthly Zoladex injections and oophorectomy.  She saw Dr. Horan recently and decided to hold off on the reconstruction. Last Mammo and US 9/18/18, MRI breast 1/2019 stable.\par \par 4/5/19:\par The patient is here for followup visit nd Zoladex injection.  She is taking exemestane daily sine 9/2017.  She would like to switch to Tamoxifen since she was discontinued on Wellbutrin last month and has weaned off Prozac 20 mg PO in 3/2019.  She wants to go back to work and avoid monthly Zoladex injections and oophorectomy.  She saw Dr. Horan recently and decided to hold off on the reconstruction. She had dx Mammo and US right breast on 9/18/18, MRI breast b/l in 1/2019. There was no suspicious finding.\par \par 7/10/19:\par The patient is here for followup visit. She was on Exemestane plus OFS with Zoladex from 9/2017 to 3/2019. She stop Zoladex injection and switch to Tamoxifen in 4/2019. She feels well and has no new complains. She had dx Mammo and US right breast on 9/18/18, MRI breast b/l in 1/2019. There was no suspicious finding.\par  [de-identified] : 42-year-old female is here for f/u visit.  She has stage IIA, m2 pT1C N1a M0, ER/CT positive, HER-2/amy negative invasive moderately differentiated ductal carcinoma of the left breast, status post left mastectomy, left axillary sentinel lymph node biopsy and left axillary lymph node dissection on November 16, 2016.  She completed adjuvant chemotherapy with 4 cycles of AC with dose reduction due to poor tolerance. She completed 12 weekly Taxol.  In 9/2017, she started on Exemestane and Zoladex as adjuvant endocrine therapy. She finished adjuvant radiotherapy. \par \par During her last visit, she complained increasing body aching since she started on Exemestane. She takes Tylenol for pain but it did not relieve her pain.\par   \par In 11/2017, bone density was normal.\par In 8/2017, right breast screening mammo was normal.

## 2019-07-21 NOTE — REVIEW OF SYSTEMS
[Fatigue] : fatigue [Joint Pain] : joint pain [Joint Stiffness] : joint stiffness [Muscle Pain] : muscle pain [Easy Bruising] : a tendency for easy bruising [Negative] : Endocrine [de-identified] : numbness of hands and feet

## 2019-07-21 NOTE — PHYSICAL EXAM
[Fully active, able to carry on all pre-disease performance without restriction] : Status 0 - Fully active, able to carry on all pre-disease performance without restriction [Normal] : affect appropriate [de-identified] : The left side is status post mastectomy and implant was removed. The surgical scar is healed well.  Skin reaction to radiotherapy. There is no palpable abnormality in the right breast and the right axilla.

## 2019-07-21 NOTE — HISTORY OF PRESENT ILLNESS
[de-identified] : 42-year-old female is here for f/u visit.  She has stage IIA, m2 pT1C N1a M0, ER/SD positive, HER-2/amy negative invasive moderately differentiated ductal carcinoma of the left breast, status post left mastectomy, left axillary sentinel lymph node biopsy and left axillary lymph node dissection on November 16, 2016.  She completed adjuvant chemotherapy with 4 cycles of AC with dose reduction due to poor tolerance. She completed 12 weekly Taxol.  In 9/2017, she started on Exemestane and Zoladex as adjuvant endocrine therapy. She finished adjuvant radiotherapy. \par \par During her last visit, she complained increasing body aching since she started on Exemestane. She takes Tylenol for pain but it did not relieve her pain.\par   \par In 11/2017, bone density was normal.\par In 8/2017, right breast screening mammo was normal. [de-identified] : Today, she reports feeling well. Followed up with psychiatrist and she discontinued prozac and wellbutrin and currently on Lexapro and Effexor. Trying to wean off effexor because of constipation and she will stay on Lexapro. Discontinued Prozac approx 3-4 weeks ago. S/p port removal. \par \par She is taking exemestane currently. Did not receive Zoladex for last 2 months and muscle aches are better without Zoladex. \par \par 3/23/18:\par She restarted Prozac and Wellbutrin. She is doing better with these medications which she was on initially.  Continues to take Exemestane. Restarted Zoladex injections today. Last Zoladex injection was in Dec 2017. Continues to c/o numbness in hands and feet.\par \par 6/15/18:\par Doing well. On Prozac and Wellbutrin for anxiety/depression. On Exemestane and Zoladex injections monthly. C/o some worsening tingling sensation in the hands and feet, easy bruising and pain in the elbows. \par \par 09/14/2018\par She us here for a follow up visit. She remains on exemestane and Zoladex. She was recently diagnosed with high blood pressure and was given Norvasc. Her Prozac was increased to 80 mg. She does report bad hot flashes and arthralgias. Neuropathy is stable.\par She is scheduled to right breast US and mammogram 09/18/2018. \par offers no other complaints.\par \par 12/14/18;\par The patient is here for f/u and treatment. She has been taking exemestane daily and Zoladex injection monthly. She has right breast screening mammo in 9/2018. There was no suspicious finding. She had bone density in 12/2017 and it was normal. She feels well and does not have breast-related symptoms. \par \par 2/25/19\par Patient is here today for follow up visit and Zoladex injection.  She is taking exemestane daily sine 9/2017.  She would like to switch to Tamoxifen since she was discontinued on Wellbutrin last month and weaning off on Prozac 20 mg PO this month.  She wants to go back to work and avoid monthly Zoladex injections and oophorectomy.  She saw Dr. Horan recently and decided to hold off on the reconstruction. Last Mammo and US 9/18/18, MRI breast 1/2019 stable.\par \par 4/5/19:\par The patient is here for followup visit nd Zoladex injection.  She is taking exemestane daily sine 9/2017.  She would like to switch to Tamoxifen since she was discontinued on Wellbutrin last month and has weaned off Prozac 20 mg PO in 3/2019.  She wants to go back to work and avoid monthly Zoladex injections and oophorectomy.  She saw Dr. Horan recently and decided to hold off on the reconstruction. She had dx Mammo and US right breast on 9/18/18, MRI breast b/l in 1/2019. There was no suspicious finding.

## 2019-07-21 NOTE — CONSULT LETTER
[Please see my note below.] : Please see my note below. [Dear  ___] : Dear  [unfilled], [Courtesy Letter:] : I had the pleasure of seeing your patient, [unfilled], in my office today. [DrSe  ___] : Dr. WHALEY [Sincerely,] : Sincerely, [DrSe ___] : Dr. WHALEY [FreeTextEntry3] : Aidan Rodas MD

## 2019-07-21 NOTE — REVIEW OF SYSTEMS
[Fatigue] : fatigue [Joint Stiffness] : joint stiffness [Joint Pain] : joint pain [Muscle Pain] : muscle pain [Easy Bruising] : a tendency for easy bruising [Negative] : Endocrine [de-identified] : numbness of hands and feet

## 2019-07-26 RX ORDER — EXEMESTANE 25 MG/1
25 TABLET, FILM COATED ORAL
Qty: 30 | Refills: 4 | Status: ACTIVE | COMMUNITY
Start: 2018-12-14 | End: 1900-01-01

## 2019-07-31 ENCOUNTER — OUTPATIENT (OUTPATIENT)
Dept: OUTPATIENT SERVICES | Facility: HOSPITAL | Age: 44
LOS: 1 days | Discharge: HOME | End: 2019-07-31

## 2019-07-31 DIAGNOSIS — I97.2 POSTMASTECTOMY LYMPHEDEMA SYNDROME: ICD-10-CM

## 2019-08-05 ENCOUNTER — APPOINTMENT (OUTPATIENT)
Dept: HEMATOLOGY ONCOLOGY | Facility: CLINIC | Age: 44
End: 2019-08-05

## 2019-08-05 ENCOUNTER — APPOINTMENT (OUTPATIENT)
Dept: INFUSION THERAPY | Facility: CLINIC | Age: 44
End: 2019-08-05

## 2019-08-15 NOTE — REASON FOR VISIT
Reviewed US results with pt. Pt verbalized understanding. CT scheduled.   [Initial Evaluation] : an initial evaluation [FreeTextEntry1] : Dr. Rodas

## 2019-08-16 ENCOUNTER — APPOINTMENT (OUTPATIENT)
Dept: INFUSION THERAPY | Facility: CLINIC | Age: 44
End: 2019-08-16

## 2019-08-16 ENCOUNTER — OUTPATIENT (OUTPATIENT)
Dept: OUTPATIENT SERVICES | Facility: HOSPITAL | Age: 44
LOS: 1 days | Discharge: HOME | End: 2019-08-16

## 2019-08-16 DIAGNOSIS — Z79.811 LONG TERM (CURRENT) USE OF AROMATASE INHIBITORS: ICD-10-CM

## 2019-08-16 DIAGNOSIS — C50.812 MALIGNANT NEOPLASM OF OVERLAPPING SITES OF LEFT FEMALE BREAST: ICD-10-CM

## 2019-08-16 RX ORDER — GOSERELIN ACETATE 10.8 MG/1
3.6 IMPLANT SUBCUTANEOUS ONCE
Refills: 0 | Status: COMPLETED | OUTPATIENT
Start: 2019-08-16 | End: 2019-08-16

## 2019-08-16 RX ADMIN — GOSERELIN ACETATE 3.6 MILLIGRAM(S): 10.8 IMPLANT SUBCUTANEOUS at 11:53

## 2019-08-26 ENCOUNTER — RECORD ABSTRACTING (OUTPATIENT)
Age: 44
End: 2019-08-26

## 2019-08-26 DIAGNOSIS — C50.212 MALIGNANT NEOPLASM OF UPPER-INNER QUADRANT OF LEFT FEMALE BREAST: ICD-10-CM

## 2019-08-26 RX ORDER — OMEPRAZOLE 20 MG/1
TABLET, DELAYED RELEASE ORAL
Refills: 0 | Status: ACTIVE | COMMUNITY

## 2019-08-26 RX ORDER — RANITIDINE HCL 150 MG
CAPSULE ORAL
Refills: 0 | Status: ACTIVE | COMMUNITY

## 2019-08-26 RX ORDER — GLUC/MSM/COLGN2/HYAL/ANTIARTH3 375-375-20
TABLET ORAL
Refills: 0 | Status: ACTIVE | COMMUNITY

## 2019-08-26 RX ORDER — THIAMINE HCL 100 MG
TABLET ORAL
Refills: 0 | Status: ACTIVE | COMMUNITY

## 2019-08-26 RX ORDER — ACETAMINOPHEN 325 MG
TABLET ORAL
Refills: 0 | Status: ACTIVE | COMMUNITY

## 2019-08-26 RX ORDER — BUPROPION HYDROCHLORIDE 100 MG/1
TABLET, FILM COATED ORAL
Refills: 0 | Status: ACTIVE | COMMUNITY

## 2019-08-26 RX ORDER — CHROMIUM 200 MCG
TABLET ORAL
Refills: 0 | Status: ACTIVE | COMMUNITY

## 2019-08-26 RX ORDER — GOSERELIN ACETATE 10.8 MG/1
IMPLANT SUBCUTANEOUS
Refills: 0 | Status: ACTIVE | COMMUNITY

## 2019-08-28 ENCOUNTER — APPOINTMENT (OUTPATIENT)
Dept: PLASTIC SURGERY | Facility: CLINIC | Age: 44
End: 2019-08-28
Payer: COMMERCIAL

## 2019-08-28 VITALS
SYSTOLIC BLOOD PRESSURE: 120 MMHG | TEMPERATURE: 98.7 F | BODY MASS INDEX: 32.44 KG/M2 | HEIGHT: 64 IN | DIASTOLIC BLOOD PRESSURE: 80 MMHG | WEIGHT: 190 LBS

## 2019-08-28 DIAGNOSIS — C50.919 MALIGNANT NEOPLASM OF UNSPECIFIED SITE OF UNSPECIFIED FEMALE BREAST: ICD-10-CM

## 2019-08-28 PROCEDURE — 99214 OFFICE O/P EST MOD 30 MIN: CPT

## 2019-08-28 NOTE — ASSESSMENT
[FreeTextEntry1] : 42 yo F with h/o Left Breast CA s/p left mastectomy with axillary LN dissection and failed immediate implant reconstruction now with mastectomy defect.\par s/p adjuvant chemotherapy and RT. \par \par Per initial evaluation January 2019, she is a good candidate for delayed left breast reconstruction with ANDREW flap; she is currently hesitant to pursue surgical intervention due to having complications with last reconstruction.\par \par Now with concerns related to recent implant recall\par \par -Pt reassured, all concerns addressed\par -Discussed in detail the low incidence of ALCL with textured implants and that it is even lower given her hx of explant and capsulectomy as well as being currently asymptomatic.\par -Reviewed recent MRI January 2019 revealing no evidence of disease or suspicious mass\par -Again discussed delayed left autologous vs implant reconstruction; pt states she is not interested at this time\par -F/u with Dr. Rodas for ongoing oncologic care\par -F/u with Dr. Aguilar for right breast cancer surveillance\par -F/u PRN

## 2019-08-28 NOTE — HISTORY OF PRESENT ILLNESS
[FreeTextEntry1] : 44 yo F  with PMH of HTN, Hypothyroidism, GERD and Left Breast Ca diagnosed in 2016 s/p left mastectomy with SLNbx and axillary dissection in 2016 with Dr. Hayes. Patient underwent immediate reconstruction with silicone implant with Dr. Bermudez complicated by delayed wound healing, infection and exposed implant necessitating implant removal in May of 2017.  Patient is also s/p adjuvant chemotherapy with 4 cycles of AC, 12 weekly Taxol and is on Exemestane and Zoladex as adjuvant endocrine therapy. She also completed 6 weeks of adjuvant radiotherapy in 2017. Recent Breast MRI 19 unremarkable. \par \par Dr. Aguilar- Breast Surgery\par Dr. Rodas- Med/Onc\par Dr. 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 \par \par Nonsmoker since diagnosis\par \par Interval hx (19):  Pt presents due to concern over recent [employer] implant recall. She states that she is certain she never had tissue expanders, but did have direct to implant reconstruction followed by explant due to "rupture" and exposure. Per chart review, Dr. Bermudez documents Model "[id number]" which is a textured silicone implant, and also dictates tissue expander reconstruction; She later underwent explant of unknown implant type and capsulectomy after exposure was noted. \par \par Denies left chest swelling or fluid collection sensation\par Denies any changes in health. Off Tamoxifen due to adverse reaction. Reports weight has been fluctuating slightly.

## 2019-08-28 NOTE — PHYSICAL EXAM
[Bra Size: _______] : Bra Size: [unfilled] [de-identified] : PERRL [de-identified] : well-developed pleasant female, AAOx3 [de-identified] : NC/AT [de-identified] : supple [de-identified] : left absence of breast with thick supple mastectomy flaps with a well-healed transverse scar, improving skin hyperpigmentation secondary to radiation along the inferior aspect, well-healed axillary incision, no palpable fluid collection, no palpable nodules or LAD [de-identified] : good inspiratory effect [de-identified] : soft, nontender, well-healed  scar, good donor site for ANDREW flap  [de-identified] : slight lymphedema left upper extremity extending from hand to axilla, no cellulitis

## 2019-09-13 ENCOUNTER — APPOINTMENT (OUTPATIENT)
Dept: INFUSION THERAPY | Facility: CLINIC | Age: 44
End: 2019-09-13

## 2019-09-13 RX ORDER — GOSERELIN ACETATE 10.8 MG/1
3.6 IMPLANT SUBCUTANEOUS ONCE
Refills: 0 | Status: COMPLETED | OUTPATIENT
Start: 2019-09-13 | End: 2019-09-13

## 2019-09-13 RX ADMIN — GOSERELIN ACETATE 3.6 MILLIGRAM(S): 10.8 IMPLANT SUBCUTANEOUS at 10:12

## 2019-09-25 ENCOUNTER — APPOINTMENT (OUTPATIENT)
Dept: BREAST CENTER | Facility: CLINIC | Age: 44
End: 2019-09-25

## 2019-10-04 ENCOUNTER — APPOINTMENT (OUTPATIENT)
Dept: HEMATOLOGY ONCOLOGY | Facility: CLINIC | Age: 44
End: 2019-10-04

## 2019-10-11 ENCOUNTER — APPOINTMENT (OUTPATIENT)
Dept: INFUSION THERAPY | Facility: CLINIC | Age: 44
End: 2019-10-11

## 2019-10-11 RX ORDER — GOSERELIN ACETATE 10.8 MG/1
3.6 IMPLANT SUBCUTANEOUS ONCE
Refills: 0 | Status: COMPLETED | OUTPATIENT
Start: 2019-10-11 | End: 2019-10-11

## 2019-10-11 RX ADMIN — GOSERELIN ACETATE 3.6 MILLIGRAM(S): 10.8 IMPLANT SUBCUTANEOUS at 10:20

## 2019-10-22 ENCOUNTER — FORM ENCOUNTER (OUTPATIENT)
Age: 44
End: 2019-10-22

## 2019-10-23 ENCOUNTER — OUTPATIENT (OUTPATIENT)
Dept: OUTPATIENT SERVICES | Facility: HOSPITAL | Age: 44
LOS: 1 days | Discharge: HOME | End: 2019-10-23
Payer: COMMERCIAL

## 2019-10-23 DIAGNOSIS — Z85.3 PERSONAL HISTORY OF MALIGNANT NEOPLASM OF BREAST: ICD-10-CM

## 2019-10-23 PROCEDURE — 77065 DX MAMMO INCL CAD UNI: CPT | Mod: 26,RT

## 2019-10-23 PROCEDURE — G0279: CPT | Mod: 26,RT

## 2019-10-23 PROCEDURE — 76641 ULTRASOUND BREAST COMPLETE: CPT | Mod: 26,RT

## 2019-10-31 ENCOUNTER — LABORATORY RESULT (OUTPATIENT)
Age: 44
End: 2019-10-31

## 2019-10-31 ENCOUNTER — APPOINTMENT (OUTPATIENT)
Dept: HEMATOLOGY ONCOLOGY | Facility: CLINIC | Age: 44
End: 2019-10-31
Payer: COMMERCIAL

## 2019-10-31 ENCOUNTER — OUTPATIENT (OUTPATIENT)
Dept: OUTPATIENT SERVICES | Facility: HOSPITAL | Age: 44
LOS: 1 days | Discharge: HOME | End: 2019-10-31

## 2019-10-31 VITALS
SYSTOLIC BLOOD PRESSURE: 139 MMHG | HEIGHT: 64 IN | TEMPERATURE: 97.6 F | DIASTOLIC BLOOD PRESSURE: 85 MMHG | RESPIRATION RATE: 16 BRPM | BODY MASS INDEX: 32.44 KG/M2 | HEART RATE: 73 BPM | WEIGHT: 190 LBS

## 2019-10-31 DIAGNOSIS — C50.812 MALIGNANT NEOPLASM OF OVERLAPPING SITES OF LEFT FEMALE BREAST: ICD-10-CM

## 2019-10-31 DIAGNOSIS — Z79.811 LONG TERM (CURRENT) USE OF AROMATASE INHIBITORS: ICD-10-CM

## 2019-10-31 DIAGNOSIS — Z17.0 MALIGNANT NEOPLASM OF OVERLAPPING SITES OF LEFT FEMALE BREAST: ICD-10-CM

## 2019-10-31 LAB
ALBUMIN SERPL ELPH-MCNC: 4 G/DL
ALP BLD-CCNC: 69 U/L
ALT SERPL-CCNC: 20 U/L
ANION GAP SERPL CALC-SCNC: 13 MMOL/L
AST SERPL-CCNC: 23 U/L
BILIRUB SERPL-MCNC: 0.2 MG/DL
BUN SERPL-MCNC: 11 MG/DL
CALCIUM SERPL-MCNC: 9.4 MG/DL
CANCER AG15-3 SERPL-ACNC: 14 U/ML
CEA SERPL-MCNC: 2.4 NG/ML
CHLORIDE SERPL-SCNC: 104 MMOL/L
CO2 SERPL-SCNC: 27 MMOL/L
CREAT SERPL-MCNC: 1 MG/DL
GLUCOSE SERPL-MCNC: 93 MG/DL
HCT VFR BLD CALC: 39 %
HGB BLD-MCNC: 13.3 G/DL
MCHC RBC-ENTMCNC: 31.4 PG
MCHC RBC-ENTMCNC: 34.1 G/DL
MCV RBC AUTO: 92.2 FL
PLATELET # BLD AUTO: 235 K/UL
PMV BLD: 9.7 FL
POTASSIUM SERPL-SCNC: 3.9 MMOL/L
PROT SERPL-MCNC: 7.2 G/DL
RBC # BLD: 4.23 M/UL
RBC # FLD: 12.1 %
SODIUM SERPL-SCNC: 144 MMOL/L
WBC # FLD AUTO: 5.13 K/UL

## 2019-10-31 PROCEDURE — 99214 OFFICE O/P EST MOD 30 MIN: CPT

## 2019-10-31 NOTE — REVIEW OF SYSTEMS
[Fatigue] : fatigue [Joint Pain] : joint pain [Joint Stiffness] : joint stiffness [Muscle Pain] : muscle pain [Easy Bruising] : a tendency for easy bruising [Negative] : Endocrine [de-identified] : numbness of hands and feet

## 2019-10-31 NOTE — HISTORY OF PRESENT ILLNESS
[de-identified] : 42-year-old female is here for f/u visit.  She has stage IIA, m2 pT1C N1a M0, ER/RI positive, HER-2/amy negative invasive moderately differentiated ductal carcinoma of the left breast, status post left mastectomy, left axillary sentinel lymph node biopsy and left axillary lymph node dissection on November 16, 2016.  She completed adjuvant chemotherapy with 4 cycles of AC with dose reduction due to poor tolerance. She completed 12 weekly Taxol.  In 9/2017, she started on Exemestane and Zoladex as adjuvant endocrine therapy. She finished adjuvant radiotherapy. \par \par During her last visit, she complained increasing body aching since she started on Exemestane. She takes Tylenol for pain but it did not relieve her pain.\par   \par In 11/2017, bone density was normal.\par In 8/2017, right breast screening mammo was normal. [de-identified] : Today, she reports feeling well. Followed up with psychiatrist and she discontinued prozac and wellbutrin and currently on Lexapro and Effexor. Trying to wean off effexor because of constipation and she will stay on Lexapro. Discontinued Prozac approx 3-4 weeks ago. S/p port removal. \par \par She is taking exemestane currently. Did not receive Zoladex for last 2 months and muscle aches are better without Zoladex. \par \par 3/23/18:\par She restarted Prozac and Wellbutrin. She is doing better with these medications which she was on initially.  Continues to take Exemestane. Restarted Zoladex injections today. Last Zoladex injection was in Dec 2017. Continues to c/o numbness in hands and feet.\par \par 6/15/18:\par Doing well. On Prozac and Wellbutrin for anxiety/depression. On Exemestane and Zoladex injections monthly. C/o some worsening tingling sensation in the hands and feet, easy bruising and pain in the elbows. \par \par 09/14/2018\par She us here for a follow up visit. She remains on exemestane and Zoladex. She was recently diagnosed with high blood pressure and was given Norvasc. Her Prozac was increased to 80 mg. She does report bad hot flashes and arthralgias. Neuropathy is stable.\par She is scheduled to right breast US and mammogram 09/18/2018. \par offers no other complaints.\par \par 12/14/18;\par The patient is here for f/u and treatment. She has been taking exemestane daily and Zoladex injection monthly. She has right breast screening mammo in 9/2018. There was no suspicious finding. She had bone density in 12/2017 and it was normal. She feels well and does not have breast-related symptoms. \par \par 2/25/19\par Patient is here today for follow up visit and Zoladex injection.  She is taking exemestane daily sine 9/2017.  She would like to switch to Tamoxifen since she was discontinued on Wellbutrin last month and weaning off on Prozac 20 mg PO this month.  She wants to go back to work and avoid monthly Zoladex injections and oophorectomy.  She saw Dr. Horan recently and decided to hold off on the reconstruction. Last Mammo and US 9/18/18, MRI breast 1/2019 stable.\par \par 4/5/19:\par The patient is here for followup visit nd Zoladex injection.  She is taking exemestane daily sine 9/2017.  She would like to switch to Tamoxifen since she was discontinued on Wellbutrin last month and has weaned off Prozac 20 mg PO in 3/2019.  She wants to go back to work and avoid monthly Zoladex injections and oophorectomy.  She saw Dr. Horan recently and decided to hold off on the reconstruction. She had dx Mammo and US right breast on 9/18/18, MRI breast b/l in 1/2019. There was no suspicious finding.\par \par 7/10/19:\par The patient is here for followup visit. She was on Exemestane plus OFS with Zoladex from 9/2017 to 3/2019. She stop Zoladex injection and switch to Tamoxifen in 4/2019. She feels well and has no new complains. She had dx Mammo and US right breast on 9/18/18, MRI breast b/l in 1/2019. There was no suspicious finding.\par \par 10/31/19:\par The patient is here for followup visit. She was on Exemestane plus OFS with Zoladex from 9/2017 to 3/2019. She stop Zoladex injection and switch to Tamoxifen in 4/2019. In 7/2019, she complains leg swelling. Duplex was negative for DVT. She switched back to Exemestane. Her leg swelling resolved. She was considering prophylactic BSO but does not want to have any surgical procedure at present time. She complains joint and bone aching and does not feel that she can continue Exemestane. She is upsetting regarding if she had implant or expander in her left breast. She did not take reconstruction well. The tissue was removed.

## 2019-10-31 NOTE — PHYSICAL EXAM
[Fully active, able to carry on all pre-disease performance without restriction] : Status 0 - Fully active, able to carry on all pre-disease performance without restriction [Normal] : affect appropriate [de-identified] : The left side is status post mastectomy and implant was removed. The surgical scar is healed well. There is left chect wall lesion, no palpable left axillary lymph node, no palpable abnormality in the right breast and the right axilla.

## 2019-10-31 NOTE — CONSULT LETTER
[Dear  ___] : Dear  [unfilled], [Courtesy Letter:] : I had the pleasure of seeing your patient, [unfilled], in my office today. [Please see my note below.] : Please see my note below. [Sincerely,] : Sincerely, [FreeTextEntry3] : Aidan Rodas MD [DrSe  ___] : Dr. WHALEY [DrSe ___] : Dr. WHALEY

## 2019-10-31 NOTE — ASSESSMENT
[FreeTextEntry1] : Stage IIA, m2 pT1C N1a M0, ER/NV positive, HER-2/amy negative invasive moderately differentiated ductal carcinoma of the left breast, status post left mastectomy, left axillary sentinel lymph node biopsy and left axillary lymph node dissection, s/p adjuvant chemo, adjuvant radiotherapy, on adjuvant endocrine therapy.\par \par PLAN: \par -- Discussed AI-related muscular skeletal side effects. She will hold exemestane for 3 to 4 weeks. If she feels better, may try another AI such as Letrozole. \par -- Reschedule Zoladex injection.\par -- Will order bone scan.\par -- Blood work ordered:  CBC, CMP, CEA , CA 15-3.\par -- Continue annual screening mammo of the right breast.\par -- RTO for Follow up in one month.\par \par \par

## 2019-11-04 DIAGNOSIS — Z79.811 LONG TERM (CURRENT) USE OF AROMATASE INHIBITORS: ICD-10-CM

## 2019-11-15 ENCOUNTER — APPOINTMENT (OUTPATIENT)
Dept: HEMATOLOGY ONCOLOGY | Facility: CLINIC | Age: 44
End: 2019-11-15

## 2019-11-26 ENCOUNTER — APPOINTMENT (OUTPATIENT)
Dept: BREAST CENTER | Facility: CLINIC | Age: 44
End: 2019-11-26

## 2019-12-09 ENCOUNTER — APPOINTMENT (OUTPATIENT)
Dept: INFUSION THERAPY | Facility: CLINIC | Age: 44
End: 2019-12-09

## 2020-01-10 ENCOUNTER — APPOINTMENT (OUTPATIENT)
Dept: RADIATION ONCOLOGY | Facility: CLINIC | Age: 45
End: 2020-01-10

## 2020-03-29 LAB
ALBUMIN SERPL ELPH-MCNC: 4.4 G/DL
ALP BLD-CCNC: 73 U/L
ALT SERPL-CCNC: 31 U/L
ANION GAP SERPL CALC-SCNC: 13 MMOL/L
AST SERPL-CCNC: 26 U/L
BILIRUB SERPL-MCNC: <0.2 MG/DL
BUN SERPL-MCNC: 14 MG/DL
CALCIUM SERPL-MCNC: 9.4 MG/DL
CANCER AG15-3 SERPL-ACNC: 13.5 U/ML
CEA SERPL-MCNC: 2 NG/ML
CHLORIDE SERPL-SCNC: 102 MMOL/L
CO2 SERPL-SCNC: 27 MMOL/L
CREAT SERPL-MCNC: 1 MG/DL
GLUCOSE SERPL-MCNC: 128 MG/DL
HCT VFR BLD CALC: 40.2 %
HGB BLD-MCNC: 13.7 G/DL
MCHC RBC-ENTMCNC: 30.8 PG
MCHC RBC-ENTMCNC: 34.1 G/DL
MCV RBC AUTO: 90.3 FL
PLATELET # BLD AUTO: 240 K/UL
PMV BLD: 9.7 FL
POTASSIUM SERPL-SCNC: 3.7 MMOL/L
PROT SERPL-MCNC: 7.2 G/DL
RBC # BLD: 4.45 M/UL
RBC # FLD: 11.9 %
SODIUM SERPL-SCNC: 142 MMOL/L
WBC # FLD AUTO: 5.95 K/UL

## 2021-05-13 ENCOUNTER — OUTPATIENT (OUTPATIENT)
Dept: OUTPATIENT SERVICES | Facility: HOSPITAL | Age: 46
LOS: 1 days | Discharge: HOME | End: 2021-05-13
Payer: COMMERCIAL

## 2021-05-13 DIAGNOSIS — R14.0 ABDOMINAL DISTENSION (GASEOUS): ICD-10-CM

## 2021-05-13 DIAGNOSIS — M95.5 ACQUIRED DEFORMITY OF PELVIS: ICD-10-CM

## 2021-05-13 PROCEDURE — 76830 TRANSVAGINAL US NON-OB: CPT | Mod: 26

## 2021-05-13 PROCEDURE — 76856 US EXAM PELVIC COMPLETE: CPT | Mod: 26

## 2021-11-16 NOTE — ED ADULT TRIAGE NOTE - AS TEMP SITE
oral Suturegard Body: The suture ends were repeatedly re-tightened and re-clamped to achieve the desired tissue expansion.

## 2022-06-10 NOTE — ED ADULT NURSE NOTE - CAS TRG GENERAL AIRWAY, MLM
VSS, all questions answered. Denies recent fever or illness. Pt states ready for procedure.     Patent

## 2022-08-02 NOTE — ED ADULT NURSE NOTE - [id number]_ED_A_ED
No
Detail Level: Zone
Will treat eczema first, follow up in 6 weeks to assess remaining actinic damage

## 2022-10-11 VITALS — BODY MASS INDEX: 35.36 KG/M2 | SYSTOLIC BLOOD PRESSURE: 128 MMHG | WEIGHT: 206 LBS | DIASTOLIC BLOOD PRESSURE: 84 MMHG

## 2022-10-11 DIAGNOSIS — Z78.9 OTHER SPECIFIED HEALTH STATUS: ICD-10-CM

## 2022-10-11 DIAGNOSIS — Z01.419 ENCOUNTER FOR GYNECOLOGICAL EXAMINATION (GENERAL) (ROUTINE) W/OUT ABNORMAL FINDINGS: ICD-10-CM

## 2022-10-11 DIAGNOSIS — Z82.0 FAMILY HISTORY OF EPILEPSY AND OTHER DISEASES OF THE NERVOUS SYSTEM: ICD-10-CM

## 2022-10-11 DIAGNOSIS — Z83.6 FAMILY HISTORY OF OTHER DISEASES OF THE RESPIRATORY SYSTEM: ICD-10-CM

## 2022-10-11 DIAGNOSIS — Z87.42 PERSONAL HISTORY OF OTHER DISEASES OF THE FEMALE GENITAL TRACT: ICD-10-CM

## 2022-10-11 DIAGNOSIS — Z82.49 FAMILY HISTORY OF ISCHEMIC HEART DISEASE AND OTHER DISEASES OF THE CIRCULATORY SYSTEM: ICD-10-CM

## 2022-10-11 DIAGNOSIS — Z82.3 FAMILY HISTORY OF STROKE: ICD-10-CM

## 2022-10-11 DIAGNOSIS — Z92.89 PERSONAL HISTORY OF OTHER MEDICAL TREATMENT: ICD-10-CM

## 2022-10-11 DIAGNOSIS — Z86.59 PERSONAL HISTORY OF OTHER MENTAL AND BEHAVIORAL DISORDERS: ICD-10-CM
